# Patient Record
Sex: FEMALE | Race: WHITE | URBAN - METROPOLITAN AREA
[De-identification: names, ages, dates, MRNs, and addresses within clinical notes are randomized per-mention and may not be internally consistent; named-entity substitution may affect disease eponyms.]

---

## 2017-04-18 ENCOUNTER — HOSPITAL ENCOUNTER (OUTPATIENT)
Dept: MAMMOGRAPHY | Facility: CLINIC | Age: 32
Discharge: HOME | End: 2017-04-18
Attending: NURSE PRACTITIONER | Admitting: NURSE PRACTITIONER
Payer: COMMERCIAL

## 2017-04-18 ENCOUNTER — OFFICE VISIT (OUTPATIENT)
Dept: FAMILY MEDICINE | Facility: CLINIC | Age: 32
End: 2017-04-18
Payer: COMMERCIAL

## 2017-04-18 VITALS
HEIGHT: 67 IN | TEMPERATURE: 98 F | BODY MASS INDEX: 24.11 KG/M2 | HEART RATE: 64 BPM | SYSTOLIC BLOOD PRESSURE: 102 MMHG | WEIGHT: 153.6 LBS | RESPIRATION RATE: 18 BRPM | DIASTOLIC BLOOD PRESSURE: 64 MMHG

## 2017-04-18 DIAGNOSIS — Z80.3 FAMILY HISTORY OF MALIGNANT NEOPLASM OF BREAST: ICD-10-CM

## 2017-04-18 DIAGNOSIS — Z01.419 ENCOUNTER FOR GYNECOLOGICAL EXAMINATION WITHOUT ABNORMAL FINDING: Primary | ICD-10-CM

## 2017-04-18 DIAGNOSIS — R10.11 ABDOMINAL PAIN, RIGHT UPPER QUADRANT: ICD-10-CM

## 2017-04-18 PROBLEM — Q63.1 HORSESHOE KIDNEY: Status: ACTIVE | Noted: 2017-04-18

## 2017-04-18 PROCEDURE — 99395 PREV VISIT EST AGE 18-39: CPT | Performed by: NURSE PRACTITIONER

## 2017-04-18 PROCEDURE — 87624 HPV HI-RISK TYP POOLED RSLT: CPT | Performed by: NURSE PRACTITIONER

## 2017-04-18 PROCEDURE — G0145 SCR C/V CYTO,THINLAYER,RESCR: HCPCS | Performed by: NURSE PRACTITIONER

## 2017-04-18 PROCEDURE — G0202 SCR MAMMO BI INCL CAD: HCPCS

## 2017-04-18 ASSESSMENT — PAIN SCALES - GENERAL: PAINLEVEL: MODERATE PAIN (5)

## 2017-04-18 NOTE — PROGRESS NOTES
SUBJECTIVE:     CC: July Delaney is an 31 year old woman who presents for preventive health visit.     Healthy Habits:    Do you get at least three servings of calcium containing foods daily (dairy, green leafy vegetables, etc.)? no, taking calcium and/or vitamin D supplement: no    Amount of exercise or daily activities, outside of work: walks 3 times per week    Problems taking medications regularly No    Medication side effects: No    Have you had an eye exam in the past two years? yes    Do you see a dentist twice per year? yes    Do you have sleep apnea, excessive snoring or daytime drowsiness?no    1.) Would like to get a mammogram, has a strong history of breast cancer in her family.  2 maternal aunts with breast cancer at 29 and 33, mom with mastectomy for prevention at 30. Grandmother with uterine cancer at 50.    2.) Right side under the ribs has a spot that is bothering her, pain that comes and goes  Worse with some foods-made dietary changes but symptoms have not improved.   Ongoing for the past 6-7 months.          Today's PHQ-2 Score:   PHQ-2 ( 1999 Pfizer) 4/18/2017   Q1: Little interest or pleasure in doing things 0   Q2: Feeling down, depressed or hopeless 0   PHQ-2 Score 0       Abuse: Current or Past(Physical, Sexual or Emotional)- No  Do you feel safe in your environment - Yes    Social History   Substance Use Topics     Smoking status: Former Smoker     Smokeless tobacco: Not on file     Alcohol use Not on file     The patient does not drink >3 drinks per day nor >7 drinks per week.    No results for input(s): CHOL, HDL, LDL, TRIG, CHOLHDLRATIO, NHDL in the last 12064 hours.    Reviewed orders with patient.  Reviewed health maintenance and updated orders accordingly - Yes    Mammo Decision Support:  Alternate mammogram schedule due to breast cancer history per above    Pertinent mammograms are reviewed under the imaging tab.  History of abnormal Pap smear: NO - age 30- 65 PAP every 3 years  "recommended    Reviewed and updated as needed this visit by clinical staff  Tobacco  Allergies  Med Hx  Surg Hx  Fam Hx  Soc Hx        Reviewed and updated as needed this visit by Provider            ROS:  C: NEGATIVE for fever, chills, change in weight  I: NEGATIVE for worrisome rashes, moles or lesions  E: NEGATIVE for vision changes or irritation  ENT: NEGATIVE for ear, mouth and throat problems  R: NEGATIVE for significant cough or SOB  B: NEGATIVE for masses, tenderness or discharge  CV: NEGATIVE for chest pain, palpitations or peripheral edema  GI: POSITIVE for abdominal pain RUQ  : NEGATIVE for unusual urinary or vaginal symptoms. Periods are regular.  M: NEGATIVE for significant arthralgias or myalgia  N: NEGATIVE for weakness, dizziness or paresthesias  P: NEGATIVE for changes in mood or affect    Problem list, Medication list, Allergies, and Medical/Social/Surgical histories reviewed in EPIC and updated as appropriate.  OBJECTIVE:     /64 (BP Location: Right arm, Patient Position: Chair, Cuff Size: Adult Regular)  Pulse 64  Temp 98  F (36.7  C) (Tympanic)  Resp 18  Ht 5' 7.21\" (1.707 m)  Wt 153 lb 9.6 oz (69.7 kg)  BMI 23.91 kg/m2  EXAM:  GENERAL: healthy, alert and no distress  EYES: Eyes grossly normal to inspection, PERRL and conjunctivae and sclerae normal  HENT: ear canals and TM's normal, nose and mouth without ulcers or lesions  NECK: no adenopathy, no asymmetry, masses, or scars and thyroid normal to palpation  RESP: lungs clear to auscultation - no rales, rhonchi or wheezes  BREAST: normal without masses, tenderness or nipple discharge and no palpable axillary masses or adenopathy  CV: regular rate and rhythm, normal S1 S2, no S3 or S4, no murmur, click or rub, no peripheral edema and peripheral pulses strong  ABDOMEN: tenderness RUQ and bowel sounds normal   (female): normal female external genitalia, normal urethral meatus, vaginal mucosa pink, moist, well rugated, and " "normal cervix/adnexa/uterus without masses or discharge, IUD strings visualized  MS: no gross musculoskeletal defects noted, no edema  SKIN: no suspicious lesions or rashes  NEURO: Normal strength and tone, mentation intact and speech normal  PSYCH: mentation appears normal, affect normal/bright    ASSESSMENT/PLAN:     1. Encounter for gynecological examination without abnormal finding  - Pap imaged thin layer screen with HPV - recommended age 30 - 65  - HPV High Risk Types DNA Cervical    2. Abdominal pain, right upper quadrant  Significant RUQ discomfort will do an ultrasound to evaluate symptoms.  - US Abdomen Complete; Future    3. Family history of malignant neoplasm of breast    - *MA Screening Digital Bilateral; Future    COUNSELING:   Reviewed preventive health counseling, as reflected in patient instructions         reports that she has quit smoking. She does not have any smokeless tobacco history on file.    Estimated body mass index is 23.91 kg/(m^2) as calculated from the following:    Height as of this encounter: 5' 7.21\" (1.707 m).    Weight as of this encounter: 153 lb 9.6 oz (69.7 kg).       Counseling Resources:  ATP IV Guidelines  Pooled Cohorts Equation Calculator  Breast Cancer Risk Calculator  FRAX Risk Assessment  ICSI Preventive Guidelines  Dietary Guidelines for Americans, 2010  [employer]'s MyPlate  ASA Prophylaxis  Lung CA Screening    ASHER Epstein [facility]  "

## 2017-04-18 NOTE — PATIENT INSTRUCTIONS
Preventive Health Recommendations  Female Ages 26 - 39  Yearly exam:   See your health care provider every year in order to    Review health changes.     Discuss preventive care.      Review your medicines if you your doctor has prescribed any.    Until age 30: Get a Pap test every three years (more often if you have had an abnormal result).    After age 30: Talk to your doctor about whether you should have a Pap test every 3 years or have a Pap test with HPV screening every 5 years.   You do not need a Pap test if your uterus was removed (hysterectomy) and you have not had cancer.  You should be tested each year for STDs (sexually transmitted diseases), if you're at risk.   Talk to your provider about how often to have your cholesterol checked.  If you are at risk for diabetes, you should have a diabetes test (fasting glucose).  Shots: Get a flu shot each year. Get a tetanus shot every 10 years.   Nutrition:     Eat at least 5 servings of fruits and vegetables each day.    Eat whole-grain bread, whole-wheat pasta and brown rice instead of white grains and rice.    Talk to your provider about Calcium and Vitamin D.     Lifestyle    Exercise at least 150 minutes a week (30 minutes a day, 5 days of the week). This will help you control your weight and prevent disease.    Limit alcohol to one drink per day.    No smoking.     Wear sunscreen to prevent skin cancer.    See your dentist every six months for an exam and cleaning.    [place] Mammo Schedule   [place]- [phone]  [place]- [phone]  [place]- [phone]  [facility]- [phone]  Every other Wednesday afternoon   [place]- [phone]  Every Monday morning   Every Tuesday afternoon   Every Wednesday, Thursday, Friday   Mammogram walk-in hours in [place]: Monday-Friday, 8 a.m. - 4 p.m.  Questions? Call [phone].      Please call [phone] to schedule your ultrasound

## 2017-04-18 NOTE — NURSING NOTE
"Chief Complaint   Patient presents with     Physical       Initial /64 (BP Location: Right arm, Patient Position: Chair, Cuff Size: Adult Regular)  Pulse 64  Temp 98  F (36.7  C) (Tympanic)  Resp 18  Ht 5' 7.21\" (1.707 m)  Wt 153 lb 9.6 oz (69.7 kg)  BMI 23.91 kg/m2 Estimated body mass index is 23.91 kg/(m^2) as calculated from the following:    Height as of this encounter: 5' 7.21\" (1.707 m).    Weight as of this encounter: 153 lb 9.6 oz (69.7 kg).  Medication Reconciliation: complete    Health Maintenance that is potentially due pending provider review:  Pap Smear    Possibly completing today per provider review.    Shweta Lawrence MA  11:21 AM 4/18/2017      "

## 2017-04-18 NOTE — MR AVS SNAPSHOT
After Visit Summary   4/18/2017    July Delaney    MRN: [id number]           Patient Information     Date Of Birth          1985        Visit Information        Provider Department      4/18/2017 11:00 AM Kristine Ahumada APRN [facility]        Today's Diagnoses     Encounter for gynecological examination without abnormal finding    -  1    Family history of malignant neoplasm of breast        Abdominal pain, right upper quadrant          Care Instructions      Preventive Health Recommendations  Female Ages 26 - 39  Yearly exam:   See your health care provider every year in order to    Review health changes.     Discuss preventive care.      Review your medicines if you your doctor has prescribed any.    Until age 30: Get a Pap test every three years (more often if you have had an abnormal result).    After age 30: Talk to your doctor about whether you should have a Pap test every 3 years or have a Pap test with HPV screening every 5 years.   You do not need a Pap test if your uterus was removed (hysterectomy) and you have not had cancer.  You should be tested each year for STDs (sexually transmitted diseases), if you're at risk.   Talk to your provider about how often to have your cholesterol checked.  If you are at risk for diabetes, you should have a diabetes test (fasting glucose).  Shots: Get a flu shot each year. Get a tetanus shot every 10 years.   Nutrition:     Eat at least 5 servings of fruits and vegetables each day.    Eat whole-grain bread, whole-wheat pasta and brown rice instead of white grains and rice.    Talk to your provider about Calcium and Vitamin D.     Lifestyle    Exercise at least 150 minutes a week (30 minutes a day, 5 days of the week). This will help you control your weight and prevent disease.    Limit alcohol to one drink per day.    No smoking.     Wear sunscreen to prevent skin cancer.    See your dentist every six months for an exam  "and cleaning.    [place] Mammo Schedule   [place]- [phone]  [place]- [phone]  [place]- [phone]  [facility]- [phone]  Every other Wednesday afternoon   [place]- [phone]  Every Monday morning   Every Tuesday afternoon   Every Wednesday, Thursday, Friday   Mammogram walk-in hours in [place]: Monday-Friday, 8 a.m. - 4 p.m.  Questions? Call [phone].      Please call [phone] to schedule your ultrasound        Follow-ups after your visit        Future tests that were ordered for you today     Open Future Orders        Priority Expected Expires Ordered    US Abdomen Complete Routine 7/17/2017 4/18/2018 4/18/2017    *MA Screening Digital Bilateral Routine  4/18/2018 4/18/2017            Who to contact     If you have questions or need follow up information about today's clinic visit or your schedule please contact [facility] directly at [phone].  Normal or non-critical lab and imaging results will be communicated to you by Mercateohart, letter or phone within 4 business days after the clinic has received the results. If you do not hear from us within 7 days, please contact the clinic through Becovillaget or phone. If you have a critical or abnormal lab result, we will notify you by phone as soon as possible.  Submit refill requests through Zebit or call your pharmacy and they will forward the refill request to us. Please allow 3 business days for your refill to be completed.          Additional Information About Your Visit        Mercateohart Information     Zebit lets you send messages to your doctor, view your test results, renew your prescriptions, schedule appointments and more. To sign up, go to [email or web address]/Zebit . Click on \"Log in\" on the left side of the screen, which will take you to the Welcome page. Then click on \"Sign up Now\" on the right side of the page.     You will be asked to enter the access code listed below, as well as some personal information. " "Please follow the directions to create your username and password.     Your access code is: [email or web address]  Expires: 2017 11:38 AM     Your access code will  in 90 days. If you need help or a new code, please call your [facility] or [phone].        Care EveryWhere ID     This is your Care EveryWhere ID. This could be used by other organizations to access your [place] medical records  [id number]        Your Vitals Were     Pulse Temperature Respirations Height BMI (Body Mass Index)       64 98  F (36.7  C) (Tympanic) 18 5' 7.21\" (1.707 m) 23.91 kg/m2        Blood Pressure from Last 3 Encounters:   17 102/64   16 123/82    Weight from Last 3 Encounters:   17 153 lb 9.6 oz (69.7 kg)              We Performed the Following     HPV High Risk Types DNA Cervical     Pap imaged thin layer screen with HPV - recommended age 30 - 65        Primary Care Provider Office Phone # Fax #    Kristine ASHER Brown [phone]       [facility] [address] [address]        Thank you!     Thank you for choosing [facility]  for your care. Our goal is always to provide you with excellent care. Hearing back from our patients is one way we can continue to improve our services. Please take a few minutes to complete the written survey that you may receive in the mail after your visit with us. Thank you!             Your Updated Medication List - Protect others around you: Learn how to safely use, store and throw away your medicines at www.disposemymeds.org.          This list is accurate as of: 17 11:51 AM.  Always use your most recent med list.                   Brand Name Dispense Instructions for use    HYDROXYZINE HCL PO      Take 25 mg by mouth Reported on 2017       paragard intrauterine copper          spironolactone 100 MG tablet    ALDACTONE    90 tablet    Take 1 tablet (100 mg) by mouth daily       triamcinolone " 0.1 % cream    KENALOG    454 g    Apply to AA BID x 2-3 weeks then PRN       VENTOLIN  (90 BASE) MCG/ACT Inhaler   Generic drug:  albuterol      Inhale 2 puffs into the lungs

## 2017-04-19 ENCOUNTER — TELEPHONE (OUTPATIENT)
Dept: FAMILY MEDICINE | Facility: CLINIC | Age: 32
End: 2017-04-19

## 2017-04-19 DIAGNOSIS — Z80.3 FAMILY HISTORY OF MALIGNANT NEOPLASM OF BREAST: Primary | ICD-10-CM

## 2017-04-19 NOTE — TELEPHONE ENCOUNTER
Reason for Call:  Other     Detailed comments: Patient had a Mammo yesterday at [facility] and she was told to have a US of her breast because they were very dense.  She is having stomach US tomorrow and wanted to try and get that at the same time.  Also patient would like to sign up for [employer]elliMath[employer] - please call pt  Phone Number Patient can be reached at: Cell number on file:    Telephone Information:   Mobile [phone]       Best Time:     Can we leave a detailed message on this number? YES    Call taken on 4/19/2017 at 8:18 AM by Kennedi Ortiz

## 2017-04-19 NOTE — TELEPHONE ENCOUNTER
Dr. Burris, please advise in May villarreal's absence.  Please see below and review the patient's mammogram yesterday, please advise your recommendation. Will discuss my chart with her once US for breast approved. Thank you,   Kristen

## 2017-04-19 NOTE — TELEPHONE ENCOUNTER
I talked to the pt and she is going to call insurance again and see if they will cover a consult on breast cancer risk and let us know.

## 2017-04-19 NOTE — TELEPHONE ENCOUNTER
I tried to call pt and left message for her to call the clinic back. Given her family history I would recommend having genetic evaluation in the high risk breast cancer clinic, i believe there is someone in [place] that does this, but I put in the referral. I would not do the US yet until she has a full evaluation by genetics as she may need MRI and/or genetic testing etc.

## 2017-04-19 NOTE — TELEPHONE ENCOUNTER
July called back. She says she had called her insurance and they don't cover genetic testing unless her mother had tested positive to a specific gene. She says she doesn't want to pay for this. It was one of the technicians that did her test and told her she may need US.  I told her the radiologist doesn't make mention of doing this.  She would like to talk to Dr Burris if she is able to try her back.    Her work number is [phone]

## 2017-04-20 ENCOUNTER — HOSPITAL ENCOUNTER (OUTPATIENT)
Dept: ULTRASOUND IMAGING | Facility: CLINIC | Age: 32
Discharge: HOME | End: 2017-04-20
Attending: NURSE PRACTITIONER | Admitting: NURSE PRACTITIONER
Payer: COMMERCIAL

## 2017-04-20 DIAGNOSIS — R10.11 RIGHT UPPER QUADRANT PAIN: Primary | ICD-10-CM

## 2017-04-20 DIAGNOSIS — R10.11 ABDOMINAL PAIN, RIGHT UPPER QUADRANT: ICD-10-CM

## 2017-04-20 LAB
COPATH REPORT: NORMAL
PAP: NORMAL

## 2017-04-20 PROCEDURE — 76700 US EXAM ABDOM COMPLETE: CPT

## 2017-04-24 ENCOUNTER — MYC REFILL (OUTPATIENT)
Dept: DERMATOLOGY | Facility: CLINIC | Age: 32
End: 2017-04-24

## 2017-04-24 DIAGNOSIS — L70.0 ACNE CYSTICA: ICD-10-CM

## 2017-04-24 LAB
FINAL DIAGNOSIS: NORMAL
HPV HR 12 DNA CVX QL NAA+PROBE: NEGATIVE
HPV16 DNA SPEC QL NAA+PROBE: NEGATIVE
HPV18 DNA SPEC QL NAA+PROBE: NEGATIVE
SPECIMEN DESCRIPTION: NORMAL

## 2017-04-24 NOTE — LETTER
[facility] [place]  [address]  [facility] [address]  Phone: [phone]    April 25, 2017    July Delaney                                                                                                                       [address] [place] [address]            Dear Ms. Delaney,    We are concerned about your health care.  We recently provided you with a medication refill.  Many medications require routine follow-up with your Dermatology Provider.      At this time we ask that: You schedule a routine office visit with your Dermatology Provider to follow your Acne.    Per 11-3-2016 Dermatology office visit dictation, you were to return to Dermatology clinic in 1 month for a recheck Acne appointment.     Your prescription: Has been refilled for 1 month so you may have time for the above noted follow-up. Please be seen prior to needing your next refill of medication.     Thank you,      Matilde BARKSDALE / [facility]

## 2017-04-25 RX ORDER — SPIRONOLACTONE 100 MG/1
100 TABLET, FILM COATED ORAL DAILY
Qty: 30 TABLET | Refills: 0 | Status: SHIPPED | OUTPATIENT
Start: 2017-04-25 | End: 2017-07-13

## 2017-04-25 NOTE — TELEPHONE ENCOUNTER
Message from Squidbidt:  Original authorizing provider: LANA Hancock would like a refill of the following medications:  spironolactone (ALDACTONE) 100 MG tablet [Matilde Platt PA-C]    Preferred pharmacy: [facility] [address] [place] [address]    Comment:

## 2017-04-26 ENCOUNTER — HOSPITAL ENCOUNTER (OUTPATIENT)
Dept: NUCLEAR MEDICINE | Facility: CLINIC | Age: 32
Setting detail: NUCLEAR MEDICINE
Discharge: HOME | End: 2017-04-26
Attending: NURSE PRACTITIONER | Admitting: NURSE PRACTITIONER
Payer: COMMERCIAL

## 2017-04-26 DIAGNOSIS — R10.11 RIGHT UPPER QUADRANT PAIN: ICD-10-CM

## 2017-04-26 LAB
ALBUMIN SERPL-MCNC: 4 G/DL (ref 3.4–5)
ALP SERPL-CCNC: 53 U/L (ref 40–150)
ALT SERPL W P-5'-P-CCNC: 15 U/L (ref 0–50)
AMYLASE SERPL-CCNC: 80 U/L (ref 30–110)
ANION GAP SERPL CALCULATED.3IONS-SCNC: 6 MMOL/L (ref 3–14)
AST SERPL W P-5'-P-CCNC: 7 U/L (ref 0–45)
BILIRUB SERPL-MCNC: 0.9 MG/DL (ref 0.2–1.3)
BUN SERPL-MCNC: 8 MG/DL (ref 7–30)
CALCIUM SERPL-MCNC: 8.6 MG/DL (ref 8.5–10.1)
CHLORIDE SERPL-SCNC: 107 MMOL/L (ref 94–109)
CO2 SERPL-SCNC: 27 MMOL/L (ref 20–32)
CREAT SERPL-MCNC: 0.93 MG/DL (ref 0.52–1.04)
GFR SERPL CREATININE-BSD FRML MDRD: 70 ML/MIN/1.7M2
GLUCOSE SERPL-MCNC: 82 MG/DL (ref 70–99)
LIPASE SERPL-CCNC: 306 U/L (ref 73–393)
POTASSIUM SERPL-SCNC: 4 MMOL/L (ref 3.4–5.3)
PROT SERPL-MCNC: 7 G/DL (ref 6.8–8.8)
SODIUM SERPL-SCNC: 140 MMOL/L (ref 133–144)

## 2017-04-26 PROCEDURE — 25000128 H RX IP 250 OP 636: Performed by: NURSE PRACTITIONER

## 2017-04-26 PROCEDURE — A9537 TC99M MEBROFENIN: HCPCS | Performed by: NURSE PRACTITIONER

## 2017-04-26 PROCEDURE — 80053 COMPREHEN METABOLIC PANEL: CPT | Performed by: NURSE PRACTITIONER

## 2017-04-26 PROCEDURE — 78227 HEPATOBIL SYST IMAGE W/DRUG: CPT

## 2017-04-26 PROCEDURE — 34300033 ZZH RX 343: Performed by: NURSE PRACTITIONER

## 2017-04-26 PROCEDURE — 36415 COLL VENOUS BLD VENIPUNCTURE: CPT | Performed by: NURSE PRACTITIONER

## 2017-04-26 PROCEDURE — 83690 ASSAY OF LIPASE: CPT | Performed by: NURSE PRACTITIONER

## 2017-04-26 PROCEDURE — 82150 ASSAY OF AMYLASE: CPT | Performed by: NURSE PRACTITIONER

## 2017-04-26 RX ORDER — KIT FOR THE PREPARATION OF TECHNETIUM TC 99M MEBROFENIN 45 MG/10ML
5 INJECTION, POWDER, LYOPHILIZED, FOR SOLUTION INTRAVENOUS ONCE
Status: COMPLETED | OUTPATIENT
Start: 2017-04-26 | End: 2017-04-26

## 2017-04-26 RX ADMIN — SODIUM CHLORIDE 1.4 MCG: 9 INJECTION, SOLUTION INTRAVENOUS at 13:51

## 2017-04-26 RX ADMIN — MEBROFENIN 5 MILLICURIE: 45 INJECTION, POWDER, LYOPHILIZED, FOR SOLUTION INTRAVENOUS at 12:40

## 2017-04-28 ENCOUNTER — OFFICE VISIT (OUTPATIENT)
Dept: SURGERY | Facility: CLINIC | Age: 32
End: 2017-04-28
Payer: COMMERCIAL

## 2017-04-28 VITALS
WEIGHT: 153 LBS | DIASTOLIC BLOOD PRESSURE: 70 MMHG | SYSTOLIC BLOOD PRESSURE: 115 MMHG | BODY MASS INDEX: 24.01 KG/M2 | HEIGHT: 67 IN | TEMPERATURE: 97.9 F | HEART RATE: 66 BPM

## 2017-04-28 DIAGNOSIS — Z53.9 ERRONEOUS ENCOUNTER--DISREGARD: Primary | ICD-10-CM

## 2017-04-28 DIAGNOSIS — K82.8 BILIARY DYSKINESIA: Primary | ICD-10-CM

## 2017-04-28 PROCEDURE — 99244 OFF/OP CNSLTJ NEW/EST MOD 40: CPT | Performed by: SURGERY

## 2017-04-28 NOTE — MR AVS SNAPSHOT
After Visit Summary   4/28/2017    July Delaney    MRN: [id number]           Patient Information     Date Of Birth          1985        Visit Information        Provider Department      4/28/2017 9:00 AM Modesto Tamez MD [facility]        Today's Diagnoses     Biliary dyskinesia    -  1      Care Instructions    Per Dr. Tamez's instructions        Follow-ups after your visit        Your next 10 appointments already scheduled     May 11, 2017 11:15 AM CDT   New Visit with Kristin Sharma GC   Cancer Risk Management Program ([facility])    [facility]  [address]  [facility] [address] [phone]              Who to contact     If you have questions or need follow up information about today's clinic visit or your schedule please contact [facility] directly at [phone].  Normal or non-critical lab and imaging results will be communicated to you by MyChart, letter or phone within 4 business days after the clinic has received the results. If you do not hear from us within 7 days, please contact the clinic through MyChart or phone. If you have a critical or abnormal lab result, we will notify you by phone as soon as possible.  Submit refill requests through [facility] or call your pharmacy and they will forward the refill request to us. Please allow 3 business days for your refill to be completed.          Additional Information About Your Visit        MyChart Information     [facility] gives you secure access to your electronic health record. If you see a primary care provider, you can also send messages to your care team and make appointments. If you have questions, please call your primary care clinic.  If you do not have a primary care provider, please call [phone] and they will assist you.        Care EveryWhere ID     This is your Care EveryWhere ID. This could be used by other organizations to access your [place]  "medical records  [id number]        Your Vitals Were     Pulse Temperature Height BMI (Body Mass Index)          66 97.9  F (36.6  C) (Oral) 1.702 m (5' 7\") 23.96 kg/m2         Blood Pressure from Last 3 Encounters:   04/28/17 115/70   04/18/17 102/64   11/03/16 123/82    Weight from Last 3 Encounters:   04/28/17 69.4 kg (153 lb)   04/18/17 69.7 kg (153 lb 9.6 oz)              Today, you had the following     No orders found for display       Primary Care Provider Office Phone # Fax #    Kristine Ahumada ASHER [phone]       [address] [address]        Thank you!     Thank you for choosing [facility]  for your care. Our goal is always to provide you with excellent care. Hearing back from our patients is one way we can continue to improve our services. Please take a few minutes to complete the written survey that you may receive in the mail after your visit with us. Thank you!             Your Updated Medication List - Protect others around you: Learn how to safely use, store and throw away your medicines at www.disposemymeds.org.          This list is accurate as of: 4/28/17  9:24 AM.  Always use your most recent med list.                   Brand Name Dispense Instructions for use    HYDROXYZINE HCL PO      Take 25 mg by mouth Reported on 4/18/2017       paragard intrauterine copper          spironolactone 100 MG tablet    ALDACTONE    30 tablet    Take 1 tablet (100 mg) by mouth daily Overdue for recheck Derm appt: [phone] to schedule.       triamcinolone 0.1 % cream    KENALOG    454 g    Apply to AA BID x 2-3 weeks then PRN       VENTOLIN  (90 BASE) MCG/ACT Inhaler   Generic drug:  albuterol      Inhale 2 puffs into the lungs         "

## 2017-04-28 NOTE — PROGRESS NOTES
Asked to consult on this patient by Dr. Ahumada regarding her abdominal pain.    31-year-old female complaining of six-month history of back pain. Pain is located in the right back and is associated with occasional radiation to the front. Pain is constant and worse after meals especially greasy food. Patient has a chronic nausea associated with the pain. Pain is sharp and achy. 3 out of 10. Patient denies any history of scleral icterus. Patient recently had a HIDA scan and the pain was reproduced with CCK injection.    Patient Active Problem List   Diagnosis     Horseshoe kidney     Family history of malignant neoplasm of breast       History reviewed. No pertinent past medical history.    History reviewed. No pertinent surgical history.    Family History   Problem Relation Age of Onset     Uterine Cancer Maternal Grandmother      Breast Cancer Maternal Aunt      Breast Cancer Maternal Aunt        Social History   Substance Use Topics     Smoking status: Former Smoker     Smokeless tobacco: Not on file     Alcohol use Yes        Smoking - Counseled patient on the effects of smoking on surgical issues such as wound healing and infection rates.    History   Drug Use Not on file       Current Outpatient Prescriptions   Medication Sig Dispense Refill     spironolactone (ALDACTONE) 100 MG tablet Take 1 tablet (100 mg) by mouth daily Overdue for recheck Derm appt: [phone] to schedule. 30 tablet 0     paragard intrauterine copper        albuterol (VENTOLIN HFA) 108 (90 BASE) MCG/ACT inhaler Inhale 2 puffs into the lungs       HYDROXYZINE HCL PO Take 25 mg by mouth Reported on 4/18/2017       triamcinolone (KENALOG) 0.1 % cream Apply to AA BID x 2-3 weeks then  g 1       Allergies   Allergen Reactions     Amoxicillin Itching     Apap-Pyrilamine Hives     midol     Ceftriaxone Hives     Rocephin     Cephalexin Itching     Ciprofloxacin Itching       CBC  No results for input(s): WBC, RBC, HGB, HCT, MCV, MCH, MCHC,  RDW, PLT in the last 27796 hours.    Invalid input(s): MCT    BMP  Recent Labs   Lab Test  04/26/17   1236   NA  140   POTASSIUM  4.0   BISMARK  8.6   CHLORIDE  107   CO2  27   BUN  8   CR  0.93   GLC  82       LFTs  Recent Labs   Lab Test  04/26/17   1236   PROTTOTAL  7.0   ALBUMIN  4.0   BILITOTAL  0.9   ALKPHOS  53   AST  7   ALT  15     Results for orders placed or performed during the hospital encounter of 04/26/17   NM HepatOBiliary Scan w CCK    Narrative    NUCLEAR MEDICINE HEPATOBILIARY SCAN WITH GB EF April 26, 2017 2:43 PM     HISTORY: Right upper quadrant pain with normal ultrasound.    TECHNIQUE:  5.0 mCi of technetium 99m labeled Mebrofenin were  intravenously given while dynamically imaging the right upper abdomen.  Approximately one hour later, 1.4 mcg of cholecystokinin (CCK) was  intravenously given over 12 minutes while evaluating the gallbladder  ejection fraction.      COMPARISON:   Nuclear Study: None.    Other Relevant Studies: Abdominal ultrasound dated 4/20/2017.    FINDINGS: There is normal, homogeneous uptake of radiotracer in the  liver. The gallbladder is seen by the 5 minute image and the small  bowel is seen by the 5 minute image.    After the administration of CCK, a gallbladder ejection fraction of  19% was measured. The patient described 1/10 severity nausea and 2/10  severity abdominal pain with cholecystokinin injection. These symptoms  are different than her usual symptoms.      Impression    IMPRESSION:  1. No evidence for common bile duct or cystic duct obstruction.  2. Abnormally low gallbladder ejection fraction of 19%. Gallbladder  dyskinesis can be seen in patients with acalculous or chronic  cholecystitis.    GISELE ESCOBAR MD     ROS  Constitutional - Denies fevers, weight loss, malaise, lethargy  Neuro - Denies tremors or seizures  Pulmon - Denies SOB, dyspnea, hemoptysis, chronic cough or use of an inhaler  CV - Denies CP, SOB, lower extremity edema, difficulty w/ stairs,  "has never used NTG  GI - Denies hematemesis, BRBPR, melena, chronic diarrhea or epigastric pain   - Denies hematuria, difficulty voiding, h/o STDs  Hematology - Denies blood clotting disorders, chronic anemias  Dermatology - No melanomas or skin cancers  Rheumatology - No h/o RA  Pysch - Denies depression, bipolar d/o or schizophrenia    Exam:  Patient Vitals for the past 24 hrs:   BP Temp Temp src Pulse Height Weight   04/28/17 0900 115/70 97.9  F (36.6  C) Oral 66 1.702 m (5' 7\") 69.4 kg (153 lb)       General - Alert and Oriented X4, NAD, well nourished  HEENT - Normocephalic, atraumatic, PERRLA, Nose midline, Throat without lesions  Neck - supple, no LAD, Thyroid normal, Carotids without bruits  Lungs - Clear to auscultation bilaterally with good inspiratory effort, no tactile fremitus  CV - Heart RRR, no lift's, thrills, murmurs, rubs, or gallops. Carotid, radial, and femoral pulses 2+ bilaterally  Abdomen - Soft, non-tender, +BS, no hepatosplenomegaly, no palpable masses  Groins - 2+ pulses bilaterally and no LAD, no masses  Neuro - Full ROM, Strength 5/5 and major muscle groups, sensation intact  Extremities - No cyanosis, clubbing or edema    Assessment and plan: 31-year-old female with biliary dyskinesia. Patient is a good candidate for laparoscopic cholecystectomy. Risks benefits alternatives and complications were discussed with the patient including the possibility of infection, bleeding, or bile leak. Patient understood and wished to proceed. PATIENT IS CLEARED FOR SURGERY.    Modesto Tamez MD   "

## 2017-04-28 NOTE — LETTER
SURGERYPLANNING/SCHEDULING WORKSHEET                              [facility]  [address] [address] [phone]                          July Delaney                :  1985  MRN:  [id number]  Phone: [phone] (home)     Same Day Surgery   Surgeon: Modesto Tamez MD  Diagnosis:   Biliary dyskinesia  Allergies:  Amoxicillin; Apap-pyrilamine; Ceftriaxone; Cephalexin; and Ciprofloxacin   A preoperative evaluation and physical will be done by this office.   ====================================================  Surgical Procedure:  General Surgery:lap brad  Length of Procedure:  1 hour  Type of anesthesia:  General  The proposed surgical procedure is considered LOW risk.  Date of Procedure:________________    Time: _____________________       Special Equipment: None  Informed Consent Obtained and Signed:  NO  ====================================================  Instructions to Same Day Surgery Staff  Pneumoboots  Preop Antibiotic:  Clindamycin 600 mg IV  plus Gentamycin 1.5mg/kg IV (if penicillin allergic), pre-op within 1 hr prior to incision Infuse over 20 mins.  Preop Pain Meds:  None  Preop Orders:  Routine Standing Orders.  ====================================================  Instructions to the patient:  Preop physical exam scheduled (within 30 days or 7 days prior) with:   ____________________  Clinic:  ____________________                                         Date______________Time_________________________  Come to the hospital at: ________________________________  HOME PREPARATION:   Shower with Hibiclens the night before or the morning of surgery, gently cleaning skin from neck to feet  Bathe and brush teeth the morning of surgery.  Take medications with a sip of water the morning of surgery:   Check with  if taking insulin.  May have  a light meal, toast and clear liquids, up to 8 hrs before  surgery  May have clear liquids (liquids one can read through) up to 4 hrs before surgery  NOTHING after 4 hrs before surgery  Stop aspirin 7-10 days before surgery  Stop NSAIDS (Ibuproven, Naproxen, etc) 5 days before surgery  Stop Plavix 7-10 days before surgery      Modesto Tamez MD    4/28/2017  This form was electronically signed at chart closure                                                                        Chart Copy

## 2017-04-28 NOTE — NURSING NOTE
"Initial /70 (BP Location: Right arm, Patient Position: Chair, Cuff Size: Adult Regular)  Pulse 66  Temp 97.9  F (36.6  C) (Oral)  Ht 1.702 m (5' 7\")  Wt 69.4 kg (153 lb)  BMI 23.96 kg/m2 Estimated body mass index is 23.96 kg/(m^2) as calculated from the following:    Height as of this encounter: 1.702 m (5' 7\").    Weight as of this encounter: 69.4 kg (153 lb). .    Dawna Lira MA    "

## 2017-05-02 ENCOUNTER — ANESTHESIA EVENT (OUTPATIENT)
Dept: SURGERY | Facility: CLINIC | Age: 32
End: 2017-05-02
Payer: COMMERCIAL

## 2017-05-04 ENCOUNTER — ANESTHESIA (OUTPATIENT)
Dept: SURGERY | Facility: CLINIC | Age: 32
End: 2017-05-04
Payer: COMMERCIAL

## 2017-05-04 ENCOUNTER — HOSPITAL ENCOUNTER (OUTPATIENT)
Facility: CLINIC | Age: 32
Discharge: HOME | End: 2017-05-04
Attending: SURGERY | Admitting: SURGERY
Payer: COMMERCIAL

## 2017-05-04 VITALS
DIASTOLIC BLOOD PRESSURE: 65 MMHG | RESPIRATION RATE: 16 BRPM | OXYGEN SATURATION: 96 % | HEIGHT: 67 IN | WEIGHT: 153 LBS | TEMPERATURE: 97.9 F | BODY MASS INDEX: 24.01 KG/M2 | SYSTOLIC BLOOD PRESSURE: 120 MMHG

## 2017-05-04 DIAGNOSIS — G89.18 POST-OP PAIN: Primary | ICD-10-CM

## 2017-05-04 LAB — HCG UR QL: NEGATIVE

## 2017-05-04 PROCEDURE — 25000125 ZZHC RX 250: Performed by: SURGERY

## 2017-05-04 PROCEDURE — 36000056 ZZH SURGERY LEVEL 3 1ST 30 MIN: Performed by: SURGERY

## 2017-05-04 PROCEDURE — 25800025 ZZH RX 258: Performed by: SURGERY

## 2017-05-04 PROCEDURE — 37000008 ZZH ANESTHESIA TECHNICAL FEE, 1ST 30 MIN: Performed by: SURGERY

## 2017-05-04 PROCEDURE — 71000013 ZZH RECOVERY PHASE 1 LEVEL 1 EA ADDTL HR: Performed by: SURGERY

## 2017-05-04 PROCEDURE — 25000128 H RX IP 250 OP 636: Performed by: NURSE ANESTHETIST, CERTIFIED REGISTERED

## 2017-05-04 PROCEDURE — 25800025 ZZH RX 258: Performed by: NURSE ANESTHETIST, CERTIFIED REGISTERED

## 2017-05-04 PROCEDURE — S0077 INJECTION, CLINDAMYCIN PHOSP: HCPCS | Performed by: SURGERY

## 2017-05-04 PROCEDURE — 25000125 ZZHC RX 250: Performed by: NURSE ANESTHETIST, CERTIFIED REGISTERED

## 2017-05-04 PROCEDURE — 27210794 ZZH OR GENERAL SUPPLY STERILE: Performed by: SURGERY

## 2017-05-04 PROCEDURE — 71000027 ZZH RECOVERY PHASE 2 EACH 15 MINS: Performed by: SURGERY

## 2017-05-04 PROCEDURE — 47562 LAPAROSCOPIC CHOLECYSTECTOMY: CPT | Mod: AS | Performed by: PHYSICIAN ASSISTANT

## 2017-05-04 PROCEDURE — 25000132 ZZH RX MED GY IP 250 OP 250 PS 637: Performed by: NURSE ANESTHETIST, CERTIFIED REGISTERED

## 2017-05-04 PROCEDURE — 25000132 ZZH RX MED GY IP 250 OP 250 PS 637: Performed by: SURGERY

## 2017-05-04 PROCEDURE — 81025 URINE PREGNANCY TEST: CPT | Performed by: NURSE ANESTHETIST, CERTIFIED REGISTERED

## 2017-05-04 PROCEDURE — 47562 LAPAROSCOPIC CHOLECYSTECTOMY: CPT | Performed by: SURGERY

## 2017-05-04 PROCEDURE — 40000306 ZZH STATISTIC PRE PROC ASSESS II: Performed by: SURGERY

## 2017-05-04 PROCEDURE — 36000058 ZZH SURGERY LEVEL 3 EA 15 ADDTL MIN: Performed by: SURGERY

## 2017-05-04 PROCEDURE — 37000009 ZZH ANESTHESIA TECHNICAL FEE, EACH ADDTL 15 MIN: Performed by: SURGERY

## 2017-05-04 PROCEDURE — 25000564 ZZH DESFLURANE, EA 15 MIN: Performed by: SURGERY

## 2017-05-04 PROCEDURE — 71000012 ZZH RECOVERY PHASE 1 LEVEL 1 FIRST HR: Performed by: SURGERY

## 2017-05-04 PROCEDURE — 27110028 ZZH OR GENERAL SUPPLY NON-STERILE: Performed by: SURGERY

## 2017-05-04 PROCEDURE — 88304 TISSUE EXAM BY PATHOLOGIST: CPT | Mod: 26 | Performed by: SURGERY

## 2017-05-04 PROCEDURE — 88304 TISSUE EXAM BY PATHOLOGIST: CPT | Performed by: SURGERY

## 2017-05-04 RX ORDER — ONDANSETRON 4 MG/1
4 TABLET, ORALLY DISINTEGRATING ORAL EVERY 30 MIN PRN
Status: DISCONTINUED | OUTPATIENT
Start: 2017-05-04 | End: 2017-05-04 | Stop reason: HOSPADM

## 2017-05-04 RX ORDER — CLINDAMYCIN PHOSPHATE 900 MG/50ML
900 INJECTION, SOLUTION INTRAVENOUS
Status: COMPLETED | OUTPATIENT
Start: 2017-05-04 | End: 2017-05-04

## 2017-05-04 RX ORDER — FENTANYL CITRATE 50 UG/ML
INJECTION, SOLUTION INTRAMUSCULAR; INTRAVENOUS PRN
Status: DISCONTINUED | OUTPATIENT
Start: 2017-05-04 | End: 2017-05-04

## 2017-05-04 RX ORDER — NALOXONE HYDROCHLORIDE 0.4 MG/ML
.1-.4 INJECTION, SOLUTION INTRAMUSCULAR; INTRAVENOUS; SUBCUTANEOUS
Status: DISCONTINUED | OUTPATIENT
Start: 2017-05-04 | End: 2017-05-04 | Stop reason: HOSPADM

## 2017-05-04 RX ORDER — HYDROCODONE BITARTRATE AND ACETAMINOPHEN 5; 325 MG/1; MG/1
1-2 TABLET ORAL EVERY 6 HOURS PRN
Qty: 45 TABLET | Refills: 0 | Status: SHIPPED | OUTPATIENT
Start: 2017-05-04 | End: 2017-08-09

## 2017-05-04 RX ORDER — PROPOFOL 10 MG/ML
INJECTION, EMULSION INTRAVENOUS PRN
Status: DISCONTINUED | OUTPATIENT
Start: 2017-05-04 | End: 2017-05-04

## 2017-05-04 RX ORDER — ONDANSETRON 2 MG/ML
4 INJECTION INTRAMUSCULAR; INTRAVENOUS EVERY 30 MIN PRN
Status: DISCONTINUED | OUTPATIENT
Start: 2017-05-04 | End: 2017-05-04 | Stop reason: HOSPADM

## 2017-05-04 RX ORDER — HYDROXYZINE HYDROCHLORIDE 25 MG/1
25 TABLET, FILM COATED ORAL ONCE
Status: DISCONTINUED | OUTPATIENT
Start: 2017-05-04 | End: 2017-05-04 | Stop reason: HOSPADM

## 2017-05-04 RX ORDER — MEPERIDINE HYDROCHLORIDE 50 MG/ML
INJECTION INTRAMUSCULAR; INTRAVENOUS; SUBCUTANEOUS PRN
Status: DISCONTINUED | OUTPATIENT
Start: 2017-05-04 | End: 2017-05-04

## 2017-05-04 RX ORDER — LIDOCAINE 40 MG/G
CREAM TOPICAL
Status: DISCONTINUED | OUTPATIENT
Start: 2017-05-04 | End: 2017-05-04 | Stop reason: HOSPADM

## 2017-05-04 RX ORDER — KETOROLAC TROMETHAMINE 30 MG/ML
INJECTION, SOLUTION INTRAMUSCULAR; INTRAVENOUS PRN
Status: DISCONTINUED | OUTPATIENT
Start: 2017-05-04 | End: 2017-05-04

## 2017-05-04 RX ORDER — SCOLOPAMINE TRANSDERMAL SYSTEM 1 MG/1
1 PATCH, EXTENDED RELEASE TRANSDERMAL ONCE
Status: COMPLETED | OUTPATIENT
Start: 2017-05-04 | End: 2017-05-04

## 2017-05-04 RX ORDER — DEXAMETHASONE SODIUM PHOSPHATE 4 MG/ML
INJECTION, SOLUTION INTRA-ARTICULAR; INTRALESIONAL; INTRAMUSCULAR; INTRAVENOUS; SOFT TISSUE PRN
Status: DISCONTINUED | OUTPATIENT
Start: 2017-05-04 | End: 2017-05-04

## 2017-05-04 RX ORDER — HYDROMORPHONE HYDROCHLORIDE 1 MG/ML
.3-.5 INJECTION, SOLUTION INTRAMUSCULAR; INTRAVENOUS; SUBCUTANEOUS EVERY 10 MIN PRN
Status: DISCONTINUED | OUTPATIENT
Start: 2017-05-04 | End: 2017-05-04 | Stop reason: HOSPADM

## 2017-05-04 RX ORDER — LIDOCAINE HYDROCHLORIDE 10 MG/ML
INJECTION, SOLUTION EPIDURAL; INFILTRATION; INTRACAUDAL; PERINEURAL PRN
Status: DISCONTINUED | OUTPATIENT
Start: 2017-05-04 | End: 2017-05-04

## 2017-05-04 RX ORDER — ONDANSETRON 2 MG/ML
INJECTION INTRAMUSCULAR; INTRAVENOUS PRN
Status: DISCONTINUED | OUTPATIENT
Start: 2017-05-04 | End: 2017-05-04

## 2017-05-04 RX ORDER — HYDROCODONE BITARTRATE AND ACETAMINOPHEN 5; 325 MG/1; MG/1
1-2 TABLET ORAL EVERY 6 HOURS PRN
Status: DISCONTINUED | OUTPATIENT
Start: 2017-05-04 | End: 2017-05-04 | Stop reason: HOSPADM

## 2017-05-04 RX ORDER — SODIUM CHLORIDE, SODIUM LACTATE, POTASSIUM CHLORIDE, CALCIUM CHLORIDE 600; 310; 30; 20 MG/100ML; MG/100ML; MG/100ML; MG/100ML
INJECTION, SOLUTION INTRAVENOUS CONTINUOUS
Status: DISCONTINUED | OUTPATIENT
Start: 2017-05-04 | End: 2017-05-04 | Stop reason: HOSPADM

## 2017-05-04 RX ORDER — GLYCOPYRROLATE 0.2 MG/ML
INJECTION, SOLUTION INTRAMUSCULAR; INTRAVENOUS PRN
Status: DISCONTINUED | OUTPATIENT
Start: 2017-05-04 | End: 2017-05-04

## 2017-05-04 RX ORDER — NEOSTIGMINE METHYLSULFATE 1 MG/ML
VIAL (ML) INJECTION PRN
Status: DISCONTINUED | OUTPATIENT
Start: 2017-05-04 | End: 2017-05-04

## 2017-05-04 RX ORDER — FENTANYL CITRATE 50 UG/ML
25-50 INJECTION, SOLUTION INTRAMUSCULAR; INTRAVENOUS
Status: DISCONTINUED | OUTPATIENT
Start: 2017-05-04 | End: 2017-05-04 | Stop reason: HOSPADM

## 2017-05-04 RX ORDER — CLINDAMYCIN PHOSPHATE 900 MG/50ML
900 INJECTION, SOLUTION INTRAVENOUS SEE ADMIN INSTRUCTIONS
Status: DISCONTINUED | OUTPATIENT
Start: 2017-05-04 | End: 2017-05-04 | Stop reason: HOSPADM

## 2017-05-04 RX ORDER — CEFAZOLIN SODIUM 1 G/3ML
1 INJECTION, POWDER, FOR SOLUTION INTRAMUSCULAR; INTRAVENOUS SEE ADMIN INSTRUCTIONS
Status: DISCONTINUED | OUTPATIENT
Start: 2017-05-04 | End: 2017-05-04 | Stop reason: ALTCHOICE

## 2017-05-04 RX ORDER — CEFAZOLIN SODIUM 2 G/100ML
2 INJECTION, SOLUTION INTRAVENOUS
Status: DISCONTINUED | OUTPATIENT
Start: 2017-05-04 | End: 2017-05-04 | Stop reason: ALTCHOICE

## 2017-05-04 RX ORDER — BUPIVACAINE HYDROCHLORIDE AND EPINEPHRINE 2.5; 5 MG/ML; UG/ML
INJECTION, SOLUTION INFILTRATION; PERINEURAL PRN
Status: DISCONTINUED | OUTPATIENT
Start: 2017-05-04 | End: 2017-05-04 | Stop reason: HOSPADM

## 2017-05-04 RX ORDER — MEPERIDINE HYDROCHLORIDE 25 MG/ML
12.5 INJECTION INTRAMUSCULAR; INTRAVENOUS; SUBCUTANEOUS
Status: DISCONTINUED | OUTPATIENT
Start: 2017-05-04 | End: 2017-05-04 | Stop reason: HOSPADM

## 2017-05-04 RX ADMIN — FENTANYL CITRATE 150 MCG: 50 INJECTION, SOLUTION INTRAMUSCULAR; INTRAVENOUS at 11:12

## 2017-05-04 RX ADMIN — FENTANYL CITRATE 100 MCG: 50 INJECTION, SOLUTION INTRAMUSCULAR; INTRAVENOUS at 11:10

## 2017-05-04 RX ADMIN — KETOROLAC TROMETHAMINE 30 MG: 30 INJECTION, SOLUTION INTRAMUSCULAR at 11:37

## 2017-05-04 RX ADMIN — MEPERIDINE HYDROCHLORIDE 50 MG: 50 INJECTION, SOLUTION INTRAMUSCULAR; INTRAVENOUS; SUBCUTANEOUS at 11:46

## 2017-05-04 RX ADMIN — ROCURONIUM BROMIDE 20 MG: 10 INJECTION INTRAVENOUS at 11:12

## 2017-05-04 RX ADMIN — CLINDAMYCIN PHOSPHATE 900 MG: 18 INJECTION, SOLUTION INTRAVENOUS at 11:08

## 2017-05-04 RX ADMIN — FENTANYL CITRATE 50 MCG: 50 INJECTION, SOLUTION INTRAMUSCULAR; INTRAVENOUS at 12:47

## 2017-05-04 RX ADMIN — SODIUM CHLORIDE, POTASSIUM CHLORIDE, SODIUM LACTATE AND CALCIUM CHLORIDE: 600; 310; 30; 20 INJECTION, SOLUTION INTRAVENOUS at 12:23

## 2017-05-04 RX ADMIN — LIDOCAINE HYDROCHLORIDE 50 MG: 10 INJECTION, SOLUTION EPIDURAL; INFILTRATION; INTRACAUDAL; PERINEURAL at 11:12

## 2017-05-04 RX ADMIN — MIDAZOLAM HYDROCHLORIDE 2 MG: 1 INJECTION, SOLUTION INTRAMUSCULAR; INTRAVENOUS at 11:08

## 2017-05-04 RX ADMIN — HYDROCODONE BITARTRATE AND ACETAMINOPHEN 1 TABLET: 5; 325 TABLET ORAL at 14:08

## 2017-05-04 RX ADMIN — ONDANSETRON 4 MG: 2 INJECTION INTRAMUSCULAR; INTRAVENOUS at 11:12

## 2017-05-04 RX ADMIN — SCOPALAMINE 1 PATCH: 1 PATCH, EXTENDED RELEASE TRANSDERMAL at 12:32

## 2017-05-04 RX ADMIN — GLYCOPYRROLATE 0.2 MG: 0.2 INJECTION, SOLUTION INTRAMUSCULAR; INTRAVENOUS at 11:12

## 2017-05-04 RX ADMIN — ONDANSETRON 4 MG: 2 INJECTION INTRAMUSCULAR; INTRAVENOUS at 12:21

## 2017-05-04 RX ADMIN — HYDROXYZINE HYDROCHLORIDE 25 MG: 25 TABLET, FILM COATED ORAL at 14:09

## 2017-05-04 RX ADMIN — PROPOFOL 150 MG: 10 INJECTION, EMULSION INTRAVENOUS at 11:12

## 2017-05-04 RX ADMIN — HYDROMORPHONE HYDROCHLORIDE 0.5 MG: 1 INJECTION, SOLUTION INTRAMUSCULAR; INTRAVENOUS; SUBCUTANEOUS at 12:36

## 2017-05-04 RX ADMIN — FENTANYL CITRATE 50 MCG: 50 INJECTION, SOLUTION INTRAMUSCULAR; INTRAVENOUS at 13:06

## 2017-05-04 RX ADMIN — DEXAMETHASONE SODIUM PHOSPHATE 4 MG: 4 INJECTION, SOLUTION INTRA-ARTICULAR; INTRALESIONAL; INTRAMUSCULAR; INTRAVENOUS; SOFT TISSUE at 11:12

## 2017-05-04 RX ADMIN — ONDANSETRON 4 MG: 2 INJECTION INTRAMUSCULAR; INTRAVENOUS at 12:09

## 2017-05-04 RX ADMIN — GLYCOPYRROLATE 0.6 MG: 0.2 INJECTION, SOLUTION INTRAMUSCULAR; INTRAVENOUS at 11:42

## 2017-05-04 RX ADMIN — SODIUM CHLORIDE, POTASSIUM CHLORIDE, SODIUM LACTATE AND CALCIUM CHLORIDE: 600; 310; 30; 20 INJECTION, SOLUTION INTRAVENOUS at 10:42

## 2017-05-04 RX ADMIN — LIDOCAINE HYDROCHLORIDE 1 ML: 10 INJECTION, SOLUTION INFILTRATION; PERINEURAL at 10:42

## 2017-05-04 RX ADMIN — ROCURONIUM BROMIDE 10 MG: 10 INJECTION INTRAVENOUS at 11:11

## 2017-05-04 RX ADMIN — Medication 3 MG: at 11:42

## 2017-05-04 ASSESSMENT — LIFESTYLE VARIABLES: TOBACCO_USE: 1

## 2017-05-04 NOTE — H&P (VIEW-ONLY)
Asked to consult on this patient by Dr. Ahumada regarding her abdominal pain.    31-year-old female complaining of six-month history of back pain. Pain is located in the right back and is associated with occasional radiation to the front. Pain is constant and worse after meals especially greasy food. Patient has a chronic nausea associated with the pain. Pain is sharp and achy. 3 out of 10. Patient denies any history of scleral icterus. Patient recently had a HIDA scan and the pain was reproduced with CCK injection.    Patient Active Problem List   Diagnosis     Horseshoe kidney     Family history of malignant neoplasm of breast       History reviewed. No pertinent past medical history.    History reviewed. No pertinent surgical history.    Family History   Problem Relation Age of Onset     Uterine Cancer Maternal Grandmother      Breast Cancer Maternal Aunt      Breast Cancer Maternal Aunt        Social History   Substance Use Topics     Smoking status: Former Smoker     Smokeless tobacco: Not on file     Alcohol use Yes        Smoking - Counseled patient on the effects of smoking on surgical issues such as wound healing and infection rates.    History   Drug Use Not on file       Current Outpatient Prescriptions   Medication Sig Dispense Refill     spironolactone (ALDACTONE) 100 MG tablet Take 1 tablet (100 mg) by mouth daily Overdue for recheck Derm appt: [phone] to schedule. 30 tablet 0     paragard intrauterine copper        albuterol (VENTOLIN HFA) 108 (90 BASE) MCG/ACT inhaler Inhale 2 puffs into the lungs       HYDROXYZINE HCL PO Take 25 mg by mouth Reported on 4/18/2017       triamcinolone (KENALOG) 0.1 % cream Apply to AA BID x 2-3 weeks then  g 1       Allergies   Allergen Reactions     Amoxicillin Itching     Apap-Pyrilamine Hives     midol     Ceftriaxone Hives     Rocephin     Cephalexin Itching     Ciprofloxacin Itching       CBC  No results for input(s): WBC, RBC, HGB, HCT, MCV, MCH, MCHC,  RDW, PLT in the last 79793 hours.    Invalid input(s): MCT    BMP  Recent Labs   Lab Test  04/26/17   1236   NA  140   POTASSIUM  4.0   BISMARK  8.6   CHLORIDE  107   CO2  27   BUN  8   CR  0.93   GLC  82       LFTs  Recent Labs   Lab Test  04/26/17   1236   PROTTOTAL  7.0   ALBUMIN  4.0   BILITOTAL  0.9   ALKPHOS  53   AST  7   ALT  15     Results for orders placed or performed during the hospital encounter of 04/26/17   NM HepatOBiliary Scan w CCK    Narrative    NUCLEAR MEDICINE HEPATOBILIARY SCAN WITH GB EF April 26, 2017 2:43 PM     HISTORY: Right upper quadrant pain with normal ultrasound.    TECHNIQUE:  5.0 mCi of technetium 99m labeled Mebrofenin were  intravenously given while dynamically imaging the right upper abdomen.  Approximately one hour later, 1.4 mcg of cholecystokinin (CCK) was  intravenously given over 12 minutes while evaluating the gallbladder  ejection fraction.      COMPARISON:   Nuclear Study: None.    Other Relevant Studies: Abdominal ultrasound dated 4/20/2017.    FINDINGS: There is normal, homogeneous uptake of radiotracer in the  liver. The gallbladder is seen by the 5 minute image and the small  bowel is seen by the 5 minute image.    After the administration of CCK, a gallbladder ejection fraction of  19% was measured. The patient described 1/10 severity nausea and 2/10  severity abdominal pain with cholecystokinin injection. These symptoms  are different than her usual symptoms.      Impression    IMPRESSION:  1. No evidence for common bile duct or cystic duct obstruction.  2. Abnormally low gallbladder ejection fraction of 19%. Gallbladder  dyskinesis can be seen in patients with acalculous or chronic  cholecystitis.    GISELE ESCOBAR MD     ROS  Constitutional - Denies fevers, weight loss, malaise, lethargy  Neuro - Denies tremors or seizures  Pulmon - Denies SOB, dyspnea, hemoptysis, chronic cough or use of an inhaler  CV - Denies CP, SOB, lower extremity edema, difficulty w/ stairs,  "has never used NTG  GI - Denies hematemesis, BRBPR, melena, chronic diarrhea or epigastric pain   - Denies hematuria, difficulty voiding, h/o STDs  Hematology - Denies blood clotting disorders, chronic anemias  Dermatology - No melanomas or skin cancers  Rheumatology - No h/o RA  Pysch - Denies depression, bipolar d/o or schizophrenia    Exam:  Patient Vitals for the past 24 hrs:   BP Temp Temp src Pulse Height Weight   04/28/17 0900 115/70 97.9  F (36.6  C) Oral 66 1.702 m (5' 7\") 69.4 kg (153 lb)       General - Alert and Oriented X4, NAD, well nourished  HEENT - Normocephalic, atraumatic, PERRLA, Nose midline, Throat without lesions  Neck - supple, no LAD, Thyroid normal, Carotids without bruits  Lungs - Clear to auscultation bilaterally with good inspiratory effort, no tactile fremitus  CV - Heart RRR, no lift's, thrills, murmurs, rubs, or gallops. Carotid, radial, and femoral pulses 2+ bilaterally  Abdomen - Soft, non-tender, +BS, no hepatosplenomegaly, no palpable masses  Groins - 2+ pulses bilaterally and no LAD, no masses  Neuro - Full ROM, Strength 5/5 and major muscle groups, sensation intact  Extremities - No cyanosis, clubbing or edema    Assessment and plan: 31-year-old female with biliary dyskinesia. Patient is a good candidate for laparoscopic cholecystectomy. Risks benefits alternatives and complications were discussed with the patient including the possibility of infection, bleeding, or bile leak. Patient understood and wished to proceed. PATIENT IS CLEARED FOR SURGERY.    Modesto Tamez MD   "

## 2017-05-04 NOTE — BRIEF OP NOTE
PreOp Dx: Biliary dyskinesia    PostOp Dx: Same    Procedure: lap brad    Surgeon: LINN Tamez    Anesthesia: GET Winn CRNA    Findings: Gb without stones    IVF: 800ml    UOP: n/a    EBL: 5ml      Modesto Tamez MD

## 2017-05-04 NOTE — ANESTHESIA PREPROCEDURE EVALUATION
Anesthesia Evaluation     . Pt has not had prior anesthetic            ROS/MED HX    ENT/Pulmonary:     (+)tobacco use, Past use , . .    Neurologic:  - neg neurologic ROS     Cardiovascular:  - neg cardiovascular ROS       METS/Exercise Tolerance:  >4 METS   Hematologic:  - neg hematologic  ROS       Musculoskeletal:  - neg musculoskeletal ROS       GI/Hepatic:  - neg GI/hepatic ROS       Renal/Genitourinary: Comment: Horseshoe kidney        Endo:  - neg endo ROS       Psychiatric:  - neg psychiatric ROS       Infectious Disease:  - neg infectious disease ROS       Malignancy:      - no malignancy   Other:    (+) No chance of pregnancy                    Physical Exam  Normal systems: cardiovascular, pulmonary and dental    Airway   Mallampati: I  Neck ROM: full    Dental     Cardiovascular       Pulmonary                     Anesthesia Plan      History & Physical Review      ASA Status:  1 .    NPO Status:  > 8 hours    Plan for General and ETT with Intravenous and Propofol induction. Maintenance will be Balanced.    PONV prophylaxis:  Ondansetron (or other 5HT-3) and Dexamethasone or Solumedrol  Additional equipment: Videolaryngoscope      Postoperative Care  Postoperative pain management:  IV analgesics and Oral pain medications.      Consents  Anesthetic plan, risks, benefits and alternatives discussed with:  Patient..                          .

## 2017-05-04 NOTE — IP AVS SNAPSHOT
[facility] PreOP/Phase II    [address] [address]    Phone:  [phone]    Fax:  [phone]                                       After Visit Summary   5/4/2017    July Delaney    MRN: [id number]           After Visit Summary Signature Page     I have received my discharge instructions, and my questions have been answered. I have discussed any challenges I see with this plan with the nurse or doctor.    ..........................................................................................................................................  Patient/Patient Representative Signature      ..........................................................................................................................................  Patient Representative Print Name and Relationship to Patient    ..................................................               ................................................  Date                                            Time    ..........................................................................................................................................  Reviewed by Signature/Title    ...................................................              ..............................................  Date                                                            Time

## 2017-05-04 NOTE — OP NOTE
DATE OF SURGERY:  05/04/2017.      PREOPERATIVE DIAGNOSIS:  Biliary dyskinesia.      POSTOPERATIVE DIAGNOSIS:  Biliary dyskinesia.      PROCEDURE:  Laparoscopic cholecystectomy.      SURGEON:  Modesto Tamez MD      FIRST ASSISTANT:  MISSY Perez (needed for expertise in camera operation, retraction, hemostasis and wound closure).      ANESTHESIA:  General.      ANESTHESIOLOGIST:  Emiliano Winn CRNA      FINDINGS:  A relatively noninflamed gallbladder, successfully removed laparoscopically.      INDICATIONS:  July Delaney is a 31-year-old female seen in my clinic complaining of right upper quadrant abdominal pain.  She had a HIDA scan done showing ejection fraction of 19%.      CONSENT:  Risks, benefits, alternatives and complications were discussed with the patient, including the possibility of infection, bleeding or bile leak.  Patient understood and wished to proceed.      DESCRIPTION OF PROCEDURE:  Patient was taken to the operating room and placed in supine position.  General endotracheal anesthesia was induced and surgical timeout was performed.  Clindamycin 900 mg was used as perioperative antibiotics and her abdomen was cleaned and draped in a sterile manner.  Marcaine 0.25% with epinephrine was used to anesthetize all port sites.  A small subumbilical curvilinear incision was made and subcutaneous tissues dissected to the fascia.  The fascia was opened sharply and a 12 mm Juan Carlos trocar inserted.  Carbon dioxide was insufflated to a pressure of 15 mmHg.  Under direct vision, separate subxiphoid 11 mm trocar was placed, as were 2 right-sided 5 mm trocars.  Attention was turned to the right upper quadrant.  The gallbladder was easily located.  It was grasped and elevated.  It was not inflamed.  A small amount of omentum around the neck of the gallbladder was dissected free and the fat also surrounding the neck was dissected until the cystic duct was located.  The duct was encircled, clipped twice  proximally, once distally and ligated.  Two separate branches of the cystic artery were both located and ligated.  The gallbladder was then removed from the liver bed using electrocautery, placed in an EndoCatch bag and brought out through the subxiphoid port.  Three liters of warm normal saline solution was used to irrigate out the abdominal cavity and this was sucked free until the effluent was clear.  A final inspection of the liver bed revealed no evidence of hemorrhage or leakage and both cystic artery and duct stumps were intact with clips applied.  All trocars were then removed under direct vision and the air allowed to desufflate.  The fascial defect of the subumbilical port was closed using an 0 Vicryl suture in a figure-of-eight fashion and this was bolstered with a second 0 Vicryl on top of that and reinjected with Marcaine.  All wounds were irrigated with normal saline and the skin closed using 4-0 Vicryl in a subcuticular fashion and dressed with Dermabond.  Patient tolerated the procedure well, was extubated on the table and transferred to the PACU in stable condition.      PLAN:  Following a stable postoperative course, she will be discharged home with a regular diet.      ACTIVITY:  No heavy lifting for 1 month.      DISABILITY:  None.      MEDICATIONS:  Prescription written for Vicodin.      INSTRUCTIONS:  Patient advised to wash her wounds daily with soap and water and to follow up with me in 1-2 weeks.      ESTIMATED BLOOD LOSS:  5 mL.      INTRAVENOUS FLUIDS:  800.         FILIPE MOSS             D: 2017 11:49   T: 2017 16:59   MT: TS      Name:     TOSHA TEJEDA   MRN:      [id number]        Account:        [id number]   :      1985           Procedure Date: 2017      Document: [id number]       cc: Filipe STERLING, CNP

## 2017-05-04 NOTE — IP AVS SNAPSHOT
MRN:[id number]                      After Visit Summary   5/4/2017    July Delaney    MRN: [id number]           Thank you!     Thank you for choosing [facility] for your care. Our goal is always to provide you with excellent care. Hearing back from our patients is one way we can continue to improve our services. Please take a few minutes to complete the written survey that you may receive in the mail after you visit with us. Thank you!        Patient Information     Date Of Birth          1985        About your hospital stay     You were admitted on:  May 4, 2017 You last received care in the:  [facility] PreOP/Phase II    You were discharged on:  May 4, 2017       Who to Call     For medical emergencies, please call 911.  For non-urgent questions about your medical care, please call your primary care provider or clinic, [phone]  For questions related to your surgery, please call your surgery clinic        Attending Provider     Provider Specialty    Modesto Tamez MD Surgery       Primary Care Provider Office Phone # Fax #    Kristine ASHER Brown [phone]       [address] [address]        Your next 10 appointments already scheduled     May 11, 2017 11:15 AM CDT   New Visit with Kristin Sharma GC   Cancer Risk Management Program ([facility])    [facility]  [address]  [facility] [address]   [phone]              Further instructions from your care team                         Same Day Surgery Discharge Instructions  Special Precautions After Surgery - Adult    1. It is not unusual to feel lightheaded or faint, up to 24 hours after surgery or while taking pain medication.  If you have these symptoms; sit for a few minutes before standing and have someone assist you when getting up.  2. You should rest and relax for the next 24 hours and must have someone stay with  you for at least 24 hours after your discharge.  3. DO NOT DRIVE any vehicle or operate mechanical equipment for 24 hours following the end of your surgery.  DO NOT DRIVE while taking narcotic pain medications that have been prescribed by your physician.  If you had a limb operated on, you must be able to use it fully to drive.  4. DO NOT drink alcoholic beverages for 24 hours following surgery or while taking prescription pain medication.  5. Drink clear liquids (apple juice, ginger ale, broth, 7-Up, etc.).  Progress to your regular diet as you feel able.  6. Any questions call your physician and do not make important decisions for 24 hours.      Discharge instructions for Patient with Scopolamine Transdermal Patch    1.  You may leave the patch on behind your ear for three days-But NO LONGER.  May have withdrawal symptoms (nausea, vomiting, headache,dizziness) if used longer.  2.  When you remove the patch, you must wash and dry your hands thoroughly and before touching your eyes, as pupils may dilate.  3.  Discard patch (away from children and pets).  4.  May develop some urinary hesitancy or urine retention.  5.  Patch should be removed by:___tomorrow pm.___       Surgery Specialty Clinic:  [phone]     Same Day Surgery [phone], Monday thru Friday 6am-9pm.        Wash incisions daily with soap and water. Some mild redness or swelling is expected. If draining, cover with dry gauze.    No heavy lifting (less than 30 pounds) for 1 month.    Okay to use ice pack over wound as necessary for comfort.    Use pain medication as necessary but avoid constipating side effects. Ibuprofen okay but avoid Tylenol as your pain medication already contains this.    Diet as tolerated. No restrictions.    Follow up with Dr Tamez in 1-2 weeks.    Most people take the rest of the week off and return to work the following Monday. You may return sooner as pain allows. During your follow-up appointment, Dr. Tamez will give you a  "formal letter for your work with any restrictions detailed.  All disability or other such paperwork will be addressed at that time.                  Pending Results     No orders found from 5/2/2017 to 5/5/2017.            Admission Information     Date & Time Provider Department Dept. Phone    5/4/2017 Modesto Tamez MD [facility] PreOP/Phase [phone]      Your Vitals Were     Blood Pressure Temperature Respirations Height Weight Last Period    119/81 97.9  F (36.6  C) (Oral) 16 1.702 m (5' 7\") 69.4 kg (153 lb) 04/14/2017    Pulse Oximetry BMI (Body Mass Index)                100% 23.96 kg/m2          MyChart Information     [email or web address] gives you secure access to your electronic health record. If you see a primary care provider, you can also send messages to your care team and make appointments. If you have questions, please call your primary care clinic.  If you do not have a primary care provider, please call [phone] and they will assist you.        Care EveryWhere ID     This is your Care EveryWhere ID. This could be used by other organizations to access your [facility] medical records  [id number]           Review of your medicines      START taking        Dose / Directions    HYDROcodone-acetaminophen 5-325 MG per tablet   Commonly known as:  NORCO   Used for:  Post-op pain   Notes to Patient:  You had 1 pain pill at 2:15 pm. You had 1 vistaril at 2:15 pm.        Dose:  1-2 tablet   Take 1-2 tablets by mouth every 6 hours as needed   Quantity:  45 tablet   Refills:  0         CONTINUE these medicines which have NOT CHANGED        Dose / Directions    ALEVE PO   Indication:  Mild to Moderate Pain        Dose:  220 mg   Take 220 mg by mouth daily   Refills:  0       IBUPROFEN PO   Indication:  Mild to Moderate Pain        Dose:  400 mg   Take 400 mg by mouth every 6 hours   Refills:  0       paragard intrauterine copper        Refills:  0       spironolactone 100 MG tablet   Commonly known as:  " ALDACTONE   Used for:  Acne cystica        Dose:  100 mg   Take 1 tablet (100 mg) by mouth daily Overdue for recheck Derm appt: [phone] to schedule.   Quantity:  30 tablet   Refills:  0       triamcinolone 0.1 % cream   Commonly known as:  KENALOG   Used for:  Atopic neurodermatitis        Apply to AA BID x 2-3 weeks then PRN   Quantity:  454 g   Refills:  1       TYLENOL PO        Dose:  500 mg   Take 500 mg by mouth every 4 hours as needed for mild pain or fever   Refills:  0       VENTOLIN  (90 BASE) MCG/ACT Inhaler   Generic drug:  albuterol        Dose:  2 puff   Inhale 2 puffs into the lungs   Refills:  0            Where to get your medicines      Some of these will need a paper prescription and others can be bought over the counter. Ask your nurse if you have questions.     Bring a paper prescription for each of these medications     HYDROcodone-acetaminophen 5-325 MG per tablet                Protect others around you: Learn how to safely use, store and throw away your medicines at www.disposemymeds.org.             Medication List: This is a list of all your medications and when to take them. Check marks below indicate your daily home schedule. Keep this list as a reference.      Medications           Morning Afternoon Evening Bedtime As Needed    ALEVE PO   Take 220 mg by mouth daily                                HYDROcodone-acetaminophen 5-325 MG per tablet   Commonly known as:  NORCO   Take 1-2 tablets by mouth every 6 hours as needed   Last time this was given:  1 tablet on 5/4/2017  2:08 PM   Notes to Patient:  You had 1 pain pill at 2:15 pm. You had 1 vistaril at 2:15 pm.                                IBUPROFEN PO   Take 400 mg by mouth every 6 hours                                paragard intrauterine copper                                spironolactone 100 MG tablet   Commonly known as:  ALDACTONE   Take 1 tablet (100 mg) by mouth daily Overdue for recheck Derm appt: [phone] to  schedule.                                triamcinolone 0.1 % cream   Commonly known as:  KENALOG   Apply to AA BID x 2-3 weeks then PRN                                TYLENOL PO   Take 500 mg by mouth every 4 hours as needed for mild pain or fever                                VENTOLIN  (90 BASE) MCG/ACT Inhaler   Inhale 2 puffs into the lungs   Generic drug:  albuterol

## 2017-05-04 NOTE — ANESTHESIA POSTPROCEDURE EVALUATION
Patient: July Delaney    Procedure(s):  Laparoscopic Cholecystectomy - Wound Class: II-Clean Contaminated    Diagnosis:Biliary dyskinesia  Diagnosis Additional Information: No value filed.    Anesthesia Type:  No value filed.    Note:  Anesthesia Post Evaluation    Patient location during evaluation: Bedside  Patient participation: Able to fully participate in evaluation  Level of consciousness: awake and alert  Airway patency: patent  Cardiovascular status: acceptable  Respiratory status: acceptable  Hydration status: acceptable  PONV: controlled     Anesthetic complications: None          Last vitals:  Vitals:    05/04/17 1400 05/04/17 1415 05/04/17 1430   BP: 119/69 119/75 120/65   Resp: 16 16 16   Temp:      SpO2: 99% 100% 96%         Electronically Signed By: ASHER Hernandez CRNA  May 4, 2017  2:57 PM

## 2017-05-04 NOTE — DISCHARGE INSTRUCTIONS
Same Day Surgery Discharge Instructions  Special Precautions After Surgery - Adult    1. It is not unusual to feel lightheaded or faint, up to 24 hours after surgery or while taking pain medication.  If you have these symptoms; sit for a few minutes before standing and have someone assist you when getting up.  2. You should rest and relax for the next 24 hours and must have someone stay with you for at least 24 hours after your discharge.  3. DO NOT DRIVE any vehicle or operate mechanical equipment for 24 hours following the end of your surgery.  DO NOT DRIVE while taking narcotic pain medications that have been prescribed by your physician.  If you had a limb operated on, you must be able to use it fully to drive.  4. DO NOT drink alcoholic beverages for 24 hours following surgery or while taking prescription pain medication.  5. Drink clear liquids (apple juice, ginger ale, broth, 7-Up, etc.).  Progress to your regular diet as you feel able.  6. Any questions call your physician and do not make important decisions for 24 hours.      Discharge instructions for Patient with Scopolamine Transdermal Patch    1.  You may leave the patch on behind your ear for three days-But NO LONGER.  May have withdrawal symptoms (nausea, vomiting, headache,dizziness) if used longer.  2.  When you remove the patch, you must wash and dry your hands thoroughly and before touching your eyes, as pupils may dilate.  3.  Discard patch (away from children and pets).  4.  May develop some urinary hesitancy or urine retention.  5.  Patch should be removed by:___tomorrow pm.___       Surgery Specialty Clinic:  [phone]     Same Day Surgery [phone], Monday thru Friday 6am-9pm.        Wash incisions daily with soap and water. Some mild redness or swelling is expected. If draining, cover with dry gauze.    No heavy lifting (less than 30 pounds) for 1 month.    Okay to use ice pack over wound as necessary for  comfort.    Use pain medication as necessary but avoid constipating side effects. Ibuprofen okay but avoid Tylenol as your pain medication already contains this.    Diet as tolerated. No restrictions.    Follow up with Dr Tamez in 1-2 weeks.    Most people take the rest of the week off and return to work the following Monday. You may return sooner as pain allows. During your follow-up appointment, Dr. Tamez will give you a formal letter for your work with any restrictions detailed.  All disability or other such paperwork will be addressed at that time.

## 2017-05-04 NOTE — ANESTHESIA CARE TRANSFER NOTE
Patient: July Delaney    Procedure(s):  Laparoscopic Cholecystectomy - Wound Class: II-Clean Contaminated    Diagnosis: Biliary dyskinesia  Diagnosis Additional Information: No value filed.    Anesthesia Type:   No value filed.     Note:  Airway :Nasal Cannula  Patient transferred to:PACU        Vitals: (Last set prior to Anesthesia Care Transfer)    CRNA VITALS  5/4/2017 1139 - 5/4/2017 1213      5/4/2017             Pulse: 121    SpO2: 100 %                Electronically Signed By: ASHER Hernandez CRNA  May 4, 2017  12:13 PM

## 2017-05-08 LAB — COPATH REPORT: NORMAL

## 2017-05-09 ENCOUNTER — OFFICE VISIT (OUTPATIENT)
Dept: FAMILY MEDICINE | Facility: CLINIC | Age: 32
End: 2017-05-09
Payer: COMMERCIAL

## 2017-05-09 ENCOUNTER — MYC MEDICAL ADVICE (OUTPATIENT)
Dept: FAMILY MEDICINE | Facility: CLINIC | Age: 32
End: 2017-05-09

## 2017-05-09 VITALS
SYSTOLIC BLOOD PRESSURE: 110 MMHG | OXYGEN SATURATION: 96 % | HEIGHT: 68 IN | BODY MASS INDEX: 23.86 KG/M2 | DIASTOLIC BLOOD PRESSURE: 75 MMHG | WEIGHT: 157.4 LBS | HEART RATE: 84 BPM | TEMPERATURE: 98.3 F

## 2017-05-09 DIAGNOSIS — R30.9 PAIN PASSING URINE: Primary | ICD-10-CM

## 2017-05-09 LAB
ALBUMIN UR-MCNC: NEGATIVE MG/DL
APPEARANCE UR: CLEAR
BILIRUB UR QL STRIP: NEGATIVE
COLOR UR AUTO: YELLOW
GLUCOSE UR STRIP-MCNC: NEGATIVE MG/DL
HGB UR QL STRIP: NEGATIVE
KETONES UR STRIP-MCNC: NEGATIVE MG/DL
LEUKOCYTE ESTERASE UR QL STRIP: NEGATIVE
NITRATE UR QL: NEGATIVE
NON-SQ EPI CELLS #/AREA URNS LPF: ABNORMAL /LPF
PH UR STRIP: 7 PH (ref 5–7)
RBC #/AREA URNS AUTO: ABNORMAL /HPF (ref 0–2)
SP GR UR STRIP: 1.01 (ref 1–1.03)
URN SPEC COLLECT METH UR: ABNORMAL
UROBILINOGEN UR STRIP-ACNC: 0.2 EU/DL (ref 0.2–1)
WBC #/AREA URNS AUTO: ABNORMAL /HPF (ref 0–2)

## 2017-05-09 PROCEDURE — 99213 OFFICE O/P EST LOW 20 MIN: CPT | Performed by: FAMILY MEDICINE

## 2017-05-09 PROCEDURE — 81001 URINALYSIS AUTO W/SCOPE: CPT | Performed by: FAMILY MEDICINE

## 2017-05-09 RX ORDER — SULFAMETHOXAZOLE AND TRIMETHOPRIM 400; 80 MG/1; MG/1
1 TABLET ORAL 2 TIMES DAILY
Qty: 10 TABLET | Refills: 0 | Status: SHIPPED | OUTPATIENT
Start: 2017-05-09 | End: 2017-07-13

## 2017-05-09 NOTE — MR AVS SNAPSHOT
After Visit Summary   5/9/2017    July Delaney    MRN: [id number]           Patient Information     Date Of Birth          1985        Visit Information        Provider Department      5/9/2017 3:20 PM Francisco Javier Rivera MD [facility]        Today's Diagnoses     Pain passing urine    -  1       Follow-ups after your visit        Your next 10 appointments already scheduled     May 11, 2017 11:15 AM CDT   New Visit with Kristin Sharma GC   Cancer Risk Management Program ([facility])    [facility] [place]  [address]  [place] [id number] [phone]              Who to contact     If you have questions or need follow up information about today's clinic visit or your schedule please contact [facility] directly at [phone].  Normal or non-critical lab and imaging results will be communicated to you by MyChart, letter or phone within 4 business days after the clinic has received the results. If you do not hear from us within 7 days, please contact the clinic through MyChart or phone. If you have a critical or abnormal lab result, we will notify you by phone as soon as possible.  Submit refill requests through [facility] or call your pharmacy and they will forward the refill request to us. Please allow 3 business days for your refill to be completed.          Additional Information About Your Visit        MyChart Information     [facility] gives you secure access to your electronic health record. If you see a primary care provider, you can also send messages to your care team and make appointments. If you have questions, please call your primary care clinic.  If you do not have a primary care provider, please call [phone] and they will assist you.        Care EveryWhere ID     This is your Care EveryWhere ID. This could be used by other organizations to access your [place] medical records  [id number]       "  Your Vitals Were     Pulse Temperature Height Last Period Pulse Oximetry BMI (Body Mass Index)    84 98.3  F (36.8  C) (Tympanic) 5' 7.5\" (1.715 m) 04/14/2017 96% 24.29 kg/m2       Blood Pressure from Last 3 Encounters:   05/09/17 110/75   05/04/17 120/65   04/28/17 115/70    Weight from Last 3 Encounters:   05/09/17 157 lb 6.4 oz (71.4 kg)   05/04/17 153 lb (69.4 kg)   04/28/17 153 lb (69.4 kg)              We Performed the Following     UA with Microscopic          Today's Medication Changes          These changes are accurate as of: 5/9/17  3:59 PM.  If you have any questions, ask your nurse or doctor.               Start taking these medicines.        Dose/Directions    sulfamethoxazole-trimethoprim 400-80 MG per tablet   Commonly known as:  BACTRIM/SEPTRA   Used for:  Pain passing urine   Started by:  Francisco Javier Rivera MD        Dose:  1 tablet   Take 1 tablet by mouth 2 times daily   Quantity:  10 tablet   Refills:  0            Where to get your medicines      These medications were sent to [facility] [address] [place] [address]  [address] [facility] [address]     Phone:  [phone]     sulfamethoxazole-trimethoprim 400-80 MG per tablet                Primary Care Provider Office Phone # Fax #    Kristine ASHER Brown [phone]       [address] [address]        Thank you!     Thank you for choosing [facility]  for your care. Our goal is always to provide you with excellent care. Hearing back from our patients is one way we can continue to improve our services. Please take a few minutes to complete the written survey that you may receive in the mail after your visit with us. Thank you!             Your Updated Medication List - Protect others around you: Learn how to safely use, store and throw away your medicines at www.disposemymeds.org.          This list is accurate as of: " 5/9/17  3:59 PM.  Always use your most recent med list.                   Brand Name Dispense Instructions for use    ALEVE PO      Take 220 mg by mouth daily       HYDROcodone-acetaminophen 5-325 MG per tablet    NORCO    45 tablet    Take 1-2 tablets by mouth every 6 hours as needed       IBUPROFEN PO      Take 400 mg by mouth every 6 hours       paragard intrauterine copper          spironolactone 100 MG tablet    ALDACTONE    30 tablet    Take 1 tablet (100 mg) by mouth daily Overdue for recheck Derm appt: [phone] to schedule.       sulfamethoxazole-trimethoprim 400-80 MG per tablet    BACTRIM/SEPTRA    10 tablet    Take 1 tablet by mouth 2 times daily       triamcinolone 0.1 % cream    KENALOG    454 g    Apply to AA BID x 2-3 weeks then PRN       TYLENOL PO      Take 500 mg by mouth every 4 hours as needed for mild pain or fever       VENTOLIN  (90 BASE) MCG/ACT Inhaler   Generic drug:  albuterol      Inhale 2 puffs into the lungs

## 2017-05-09 NOTE — PROGRESS NOTES
SUBJECTIVE:                                                    July Delaney is a 31 year old female who presents to clinic today for the following health issues: recent GB surgery and now with some UTI symptoms.      URINARY TRACT SYMPTOMS      Duration: Since Friday     Description  dysuria, frequency, urgency and patient  had Gall bladder taken out on Thursday symptoms started the following day    Intensity:  Mild 3/10    Accompanying signs and symptoms:  Fever/chills: no   Flank pain no   Nausea and vomiting: no   Vaginal symptoms: discharge and odor  Abdominal/Pelvic Pain: YES- just had laporscopic surgery    History  History of frequent UTI's: YES  History of kidney stones: no   Sexually Active: YES- not at this time due to surgery  Possibility of pregnancy: No    Precipitating or alleviating factors: None    Therapies tried and outcome: ibuprofen and Tylenol   Outcome: some relief       Problem list and histories reviewed & adjusted, as indicated.  Additional history:     Patient Active Problem List   Diagnosis     Horseshoe kidney     Family history of malignant neoplasm of breast     Past Surgical History:   Procedure Laterality Date     CHOLECYSTECTOMY       LAPAROSCOPIC CHOLECYSTECTOMY N/A 5/4/2017    Procedure: LAPAROSCOPIC CHOLECYSTECTOMY;  Laparoscopic Cholecystectomy;  Surgeon: Modesto Tamez MD;  Location: [facility] OR       Social History   Substance Use Topics     Smoking status: Former Smoker     Smokeless tobacco: Not on file     Alcohol use Yes     Family History   Problem Relation Age of Onset     Uterine Cancer Maternal Grandmother      Breast Cancer Maternal Aunt      Breast Cancer Maternal Aunt          Current Outpatient Prescriptions   Medication Sig Dispense Refill     sulfamethoxazole-trimethoprim (BACTRIM/SEPTRA) 400-80 MG per tablet Take 1 tablet by mouth 2 times daily 10 tablet 0     IBUPROFEN PO Take 400 mg by mouth every 6 hours       Naproxen Sodium (ALEVE PO) Take 220 mg by mouth daily  "      Acetaminophen (TYLENOL PO) Take 500 mg by mouth every 4 hours as needed for mild pain or fever       spironolactone (ALDACTONE) 100 MG tablet Take 1 tablet (100 mg) by mouth daily Overdue for recheck Derm appt: [phone] to schedule. 30 tablet 0     paragard intrauterine copper        albuterol (VENTOLIN HFA) 108 (90 BASE) MCG/ACT inhaler Inhale 2 puffs into the lungs       triamcinolone (KENALOG) 0.1 % cream Apply to AA BID x 2-3 weeks then  g 1     HYDROcodone-acetaminophen (NORCO) 5-325 MG per tablet Take 1-2 tablets by mouth every 6 hours as needed (Patient not taking: Reported on 5/9/2017) 45 tablet 0       Reviewed and updated as needed this visit by clinical staff       Reviewed and updated as needed this visit by Provider         ROS:  C: NEGATIVE for fever, chills, change in weight  E/M: NEGATIVE for ear, mouth and throat problems  R: NEGATIVE for significant cough or SOB  CV: NEGATIVE for chest pain, palpitations or peripheral edema    OBJECTIVE:                                                    /75 (BP Location: Right arm, Patient Position: Chair, Cuff Size: Adult Regular)  Pulse 84  Temp 98.3  F (36.8  C) (Tympanic)  Ht 5' 7.5\" (1.715 m)  Wt 157 lb 6.4 oz (71.4 kg)  LMP 04/14/2017  SpO2 96%  BMI 24.29 kg/m2  Body mass index is 24.29 kg/(m^2).  GENERAL: healthy, alert and no distress  NECK: no adenopathy, no asymmetry, masses, or scars and thyroid normal to palpation  ABDOMEN: soft, nontender, no hepatosplenomegaly, no masses and bowel sounds normal  MS: no gross musculoskeletal defects noted, no edema  MS:          ASSESSMENT/PLAN:                                                            1. Pain passing urine  Mild early UTI.   - UA with Microscopic  - sulfamethoxazole-trimethoprim (BACTRIM/SEPTRA) 400-80 MG per tablet; Take 1 tablet by mouth 2 times daily  Dispense: 10 tablet; Refill: 0        Francisco Javier Rivera MD  [facility]  "

## 2017-05-09 NOTE — NURSING NOTE
"No chief complaint on file.      Initial LMP 02/24/2017 (Exact Date) Estimated body mass index is 23.49 kg/(m^2) as calculated from the following:    Height as of 4/4/17: 5' 7\" (1.702 m).    Weight as of 4/4/17: 150 lb (68 kg).  Medication Reconciliation: complete     Yulia Mena CMA (AAMA)  "

## 2017-05-11 ENCOUNTER — ONCOLOGY VISIT (OUTPATIENT)
Dept: ONCOLOGY | Facility: CLINIC | Age: 32
End: 2017-05-11
Attending: GENETIC COUNSELOR, MS
Payer: COMMERCIAL

## 2017-05-11 DIAGNOSIS — Z80.49 FAMILY HISTORY OF UTERINE CANCER: ICD-10-CM

## 2017-05-11 DIAGNOSIS — Z80.3 FAMILY HISTORY OF MALIGNANT NEOPLASM OF BREAST: Primary | ICD-10-CM

## 2017-05-11 PROBLEM — Z80.42 FAMILY HISTORY OF PROSTATE CANCER: Status: ACTIVE | Noted: 2017-05-11

## 2017-05-11 LAB — MISCELLANEOUS TEST: NORMAL

## 2017-05-11 PROCEDURE — 36415 COLL VENOUS BLD VENIPUNCTURE: CPT

## 2017-05-11 PROCEDURE — 96040 ZZH GENETIC COUNSELING, EACH 30 MINUTES: CPT

## 2017-05-11 NOTE — LETTER
Cancer Risk Management  Program Locations    [facility]  [facility]  [facility]  Mailing Address  Cancer Risk Management Program  [facility]  [address]  [place] [address]    New patient appointments  [phone]  May 11, 2017    July Vekailees  [address] [place]  [place] [address]      Dear July,  It was a pleasure to meet you. This is a summary of your genetic counseling visit.      5/11/2017    Referring Provider: Eveline Frye MD    Presenting Information:   I met with July Delaney today for genetic counseling at the Cancer Risk Management Program at the [facility] to discuss her  family history of breast and uterine cancer.  She is here today to review this history, cancer screening recommendations, and available genetic testing options.    Personal History:  July is a 31 year old female.  She does not have any personal history of cancer.       She had her first menstrual period at age 16 and  her first child at age 19.  July has her ovaries, fallopian tubes and uterus in place.   Her most recent OB-GYN exam and Pap smear in April 2017 and were normal. She had a normal clinical breast exam at that time, too. In April of 2017, she had a normal mammogram because of family history of breast cancer. July has not had a colonoscopy. She plans to have a dermatology exam this year. July also has a horseshoe kidney    Family History: (Please see scanned pedigree for detailed family history information)    A maternal aunt was diagnosed with breast cancer at age 33. She had a unilateral radical mastectomy. She is now 66.    Another maternal aunt was diagnosed with breast cancer at age 29 and passed away at age 33.    Her mother had a prophylactic mastectomy at age 30  because of her family history.  She is now 60 years old.    Her maternal grandmother was diagnosed with uterine cancer in her 50's. She  at 83 from Alzheimer's disease.    Two maternal great aunts, one related to her maternal grandmother and the other related to her maternal grandfather both had breast cancer at later ages.    Her paternal grandfather was diagnosed with prostate cancer in his 70's. He is now 84 years old.    Her maternal ethnicity is Samoan. Her paternal ethnicity is Scandinavian.  There is no known Ashkenazi Christian ancestry on either side of her family.     Discussion:    July's family history of two maternal aunts diagnosed with breast cancer at a young age,  may is suggestive of a hereditary cancer syndrome.     No one has had genetic testing for hereditary cancer in July's family. Her mother and maternal aunt are unavailable for testing at this time.    We discussed the natural history and genetics of the BRCA1 and BRCA2 genes. We discussed that there are additional genes that could cause increased risk of breast and uterine cancer.  As many of these genes present with overlapping features in a family, it would be reasonable for July to consider panel genetic testing to analyze multiple genes at once. The most appropriate panel would be the OvaNext panel for hereditary breast and GYN cancers.  Genetic testing is available for 25 genes associated with hereditary breast and  gynecologic cancers: OvaNext (KILO, BARD1, BRCA1, BRCA2, BRIP1, CDH1, CHEK2, DICER1, EPCAM, MLH1, MRE11A, MSH2, MSH6, MUTYH, NBN, NF1, PALB2, PMS2, PTEN, RAD50, RAD51C, RAD51D, SMARCA4, STK11, and TP53).  We discussed that some of the genes in the OvaNext panel are associated with specific hereditary cancer syndromes and have published management guidelines: Hereditary Breast and Ovarian Cancer syndrome (BRCA1, BRCA2), Calzada syndrome (MLH1, MSH2, MSH6, PMS2, EPCAM), Hereditary Diffuse Gastric Cancer (CDH1), Cowden syndrome (PTEN), Li Fraumeni  syndrome (TP53), Peutz-Jeghers syndrome (STK11), MUTYH Associated Polyposis syndrome (MUTYH), and Neurofibromatosis type 1 (NF1).    Risk-reducing salpingo-oophorectomy can be considered in women with mutations in BRIP1, RAD51C, or RAD51D.  Breast cancer risk management guidelines are available for KILO, CHEK2, PALB2, NF1, and NBN.  The remaining genes (BARD1, DICER1, MRE11A, RAD50, and SMARCA4) are associated with increased cancer risk and may allow us to make medical recommendations when mutations are identified.     A detailed handout regarding these gene and the information we discussed was provided to July at the end of our appointment today and can be found in the after visit summary.  Topics included: inheritance pattern, cancer risks, cancer screening recommendations, and also risks, benefits and limitations of testing. uJly also received a list of the genes and associated cancers from [facility].  Consent was obtained and genetic testing for OvaNext was sent to [facility]. Turn around time: approximately 4 weeks.    Based on her personal and family history, July meets current National Comprehensive Cancer Network (NCCN) criteria for genetic testing of BRCA1 and BRCA2 testing.  She has two maternal aunts diagnosed with breast cancer under the age of 50.       Medical Management: The information from genetic testing may determine additional cancer screening recommendations (i.e. mammogram and breast MRI, more frequent colonoscopies, annual dermatologic exams, etc.) as well as options for risk reducing surgeries (i.e. bilateral mastectomy, surgery to remove the ovaries and/or uterus, etc.) for July and her relatives.  These recommendations will be discussed in detail once genetic testing is completed. The screening is specific to the gene identified with a mutation on the panel.    Plan:  1) Today July elected to proceed with the OvaNext Panel for hereditary cancer.  2) This  information should be available in 4-6 weeks.  3) July will return to clinic to discuss the results    Face to face time: 45 minutes        Please call me if you have any additional questions.    Sincerely,    Kristin Sharma, MS [facility]  Genetic Counselor  [phone] (phone)

## 2017-05-11 NOTE — PROGRESS NOTES
Medical Assistant Note:  July Elaina presents today for lab visit.    Patient seen by provider today: Yes: Seen by Kristin Sharma.   present during visit today: Not Applicable.    Concerns: No Concerns.    Procedure:  Lab draw site: RAC, Needle type: BF, Gauge: 21 g gauze and coban applied.    Post Assessment:  Labs drawn without difficulty: Yes.    Discharge Plan:  Departure Mode: Ambulatory.    Face to Face Time: 5.    Shani Yates MA

## 2017-05-11 NOTE — MR AVS SNAPSHOT
After Visit Summary   5/11/2017    July Delaney    MRN: [id number]           Patient Information     Date Of Birth          1985        Visit Information        Provider Department      5/11/2017 11:15 AM Kristin Sharma [facility] Cancer Risk Management Program        Care Instructions          Assessing Cancer Risk  Only about 5-10% of cancers are thought to be due to an inherited cancer susceptibility gene.    These families often have:    Several people with the same or related types of cancer    Cancers diagnosed at a young age (before age 50)    Individuals with more than one primary cancer    Multiple generations of the family affected with cancer    Some people may be candidates for genetic testing of more than one gene.  For these families, genetic testing using a cancer panel may be offered.  These panels can test many genes at once known to increase the risk for gynecologic (and other) cancers:  BRCA1, BRCA2, BRIP1 MLH1, MSH2, MSH6, PMS2, EPCAM, PTEN, PALB2, RAD51C, RAD51D, and TP53. The purpose of this handout is to serve as a brief summary of the gynecologic cancer risk genes that have published clinical management guidelines for individuals who are found to carry a mutation.    ______________________________________________________________________________  Hereditary Breast and Ovarian Cancer Syndrome   (BRCA1 and BRCA2)  A single mutation in one of the copies of BRCA1 or BRCA2 increases the risk for breast and ovarian cancer, among others.  The risk for pancreatic cancer and melanoma may also be slightly increased in some families.  The chart below shows the chance that someone with a BRCA mutation would develop cancer in his or her lifetime1,2,3,4.        A person s ethnic background is also important to consider, as individuals of Ashkenazi Worship ancestry have a higher chance of having a BRCA gene mutation.  There are three BRCA mutations that occur more frequently in this  population.    Calzada Syndrome   (MLH1, MSH2, MSH6, PMS2, and EPCAM)  Currently five genes are known to cause Calzada Syndrome: MLH1, MSH2, MSH6, PMS2, and EPCAM.  A single mutation in one of the Calzada Syndrome genes increases the risk for colon, endometrial, ovarian, and stomach cancers.  Other cancers that occur less commonly in Calzada Syndrome include urinary tract, skin, and brain cancers.  The chart below shows the chance that a person with Calzada syndrome would develop cancer in his or her lifetime7.      *Cancer risk varies depending on Calzada syndrome gene found    Cowden Syndrome   (PTEN)  Cowden syndrome is a hereditary condition that increases the risk for breast, thyroid, endometrial, and kidney cancer.  Cowden syndrome is caused by a mutation in the PTEN gene.  A single mutation in one of the copies of PTEN causes Cowden syndrome and increases cancer risk.  The chart below shows the chance that someone with a PTEN mutation would develop cancer in their lifetime5,6.  Other benign features seen in some individuals with Cowden syndrome include benign skin lesions (facial papules, keratoses, lipomas), learning disability, autism, thyroid nodules, colon polyps, and larger head size.      *One recent study found breast cancer risk to be increased to 85%  Li-Fraumeni Syndrome   (TP53)  Li-Fraumeni Syndrome (LFS) is a cancer predisposition syndrome caused by a mutation in the TP53 gene. A single mutation in one of the copies of TP53 increases the risk for multiple cancers. Individuals with LFS are at an increased risk for developing cancer at a young age. The general lifetime risk for development of cancer is 50% by age 30 and 90% by age 60.     Core Cancers: Sarcomas, Breast, Brain, Lung, Leukemias/Lymphomas, Adrenocortical carcinomas  Other Cancers: Gastrointestinal, Thyroid, Skin, Genitourinary    Additional Genes  PALB2  Mutations in PALB2 have been shown to increase the risk of breast cancer up to 33-58% in some  families; where individuals fall within this risk range is dependent upon family history. PALB2 mutations have also been associated with increased risk for pancreatic cancer, although this risk has not been quantified yet.  Individuals who inherit two PALB2 mutations--one from their mother and one from their father--have a condition called Fanconi Anemia.  This rare autosomal recessive condition is associated with short stature, developmental delay, bone marrow failure, and increased risk for childhood cancers.    BRIP1, RAD51C and RAD51D  Mutations in BRIP1, RAD51C, and RAD51D have been shown to increase the risk of ovarian cancer as well as female breast cancer.    ______________________________________________________________________________    Inheritance  All of the cancer syndromes reviewed above are inherited in an autosomal dominant pattern.  This means that if a parent has a mutation, each of his or her children will have a 50% chance of inheriting that same mutation.  Therefore, each child--male or female--would have a 50% chance of being at increased risk for developing cancer.      Image obtained from Genetics Home Reference, 2013     Mutations in some genes can occur de mont[place], which means that a person s mutation occurred for the first time in them and was not inherited from a parent.  Now that they have the mutation, however, it can be passed on to future generations.    Genetic Testing  Genetic testing involves a blood test and will look for any harmful mutations that are associated with increased cancer risk.  If possible, it is recommended that the person(s) who has had cancer be tested before other family members.  That person will give us the most useful information about whether or not a specific gene is associated with the cancer in the family.    Results  There are three possible results of genetic testing:    Positive--a harmful mutation was identified in one or more of the  genes    Negative--no mutation was identified in any of the 9 genes on this panel    Variant of unknown significance--a variation in one of the genes was identified, but it is unclear how this impacts cancer risk in the family    Advantages and Disadvantages   There are advantages and disadvantages to genetic testing.  Advantages    May clarify your cancer risk    Can help you make medical decisions    May explain the cancers in your family    May give useful information to your family members (if you share your results)    Disadvantages    Possible negative emotional impact of learning about inherited cancer risk    Uncertainty in interpreting a negative test result in some situations    Possible genetic discrimination concerns (see below)    Genetic Information Nondiscrimination Act (ALBERTINA)  AL[place]ERTINA is a federal law that protects individuals from health insurance or employment discrimination based on a genetic test result alone.  Although rare, there are currently no legal discrimination protections in terms of life insurance, long term care, or disability insurances.  Visit the National Human Genome Research [place] website to learn more.    Reducing Cancer Risk  Each of the genes listed within this handout have nationally recognized cancer screening guidelines that would be recommended for individuals who test positive.  In addition to increased cancer screening, surgeries may be offered or recommended to reduce cancer risk.  Recommendations are based upon an individual s genetic test result as well as their personal and family history of cancer.    Questions to Think About Regarding Genetic Testing:    What effect will the test result have on me and my relationship with my family members if I have an inherited gene mutation?  If I don t have a gene mutation?    Should I share my test results, and how will my family react to this news, which may also affect them?    Are my children ready to learn new information  that may one day affect their own health?        Hereditary Cancer Resources    FORCE: Facing Our Risk of Cancer Empowered facingourrisk.org   Bright Pink bebrightpink.org   Li-Fraumeni Syndrome Association lfsassociation.org   PTEN World PTENworld.com   Collaborative Group of the Americas on Inherited Colorectal Cancer (CGA) cgaicc.com http://www.facingourrisk.org/   Cancer Care cancercare.org   American Cancer Society (ACS) cancer.org   National Cancer Verbena (NCI) cancer.gov       Cancer Risk Management Program [phone] ([phone])  ? Kristin Sharma, MS, [facility]  [phone]  ? Belle Toy, MS, [facility]  [phone]  ? Marian Lama, MS, [facility]  [phone]  ? Xiomara Johns, MS, [facility]  [phone]    References  1. Katherine LAKHANI, Kamran PDP, Randall S, Bridger ESQUIVEL, Lisa JE, Yoav JL, Landen N, Earnestine H, Kandi O, Rosio A, Gianni B, Katiana P, Manfredrick S, Rebecca DM, Lima N, Sofia E, Hamlet H, Mikey E, Guero J, Gronwald J, Ashley B, Anirudh H, Thorlacius S, Eerola H, Miguel H, Aunshka K, Russell OP. Average risks of breast and ovarian cancer associated with BRCA1 or BRCA2 mutations detected in case series unselected for family history: a combined analysis of 222 studies. Am J Hum Nallely. 2003;72:1117-30.  2. Kristina N, Joann M, John G.  BRCA1 and BRCA2 Hereditary Breast and Ovarian Cancer. Gene Reviews online. 2013.  3. Mike YC, Sherine S, Luisa G, Garcia S. Breast cancer risk among male BRCA1 and BRCA2 mutation carriers. J Natl Cancer Inst. 2007;99:1811-4.  4. Norman KATZ, Christin I, Dustin J, Geo E, Sorin ER, Shea F. Risk of breast cancer in male BRCA2 carriers. J Med Nallely. 2010;47:710-1.  5. Faraz ONOFRE, Dillan J, Mara J, Jeffy LA, Jennifer MS, Eng C. Lifetime cancer risks in individuals with germline PTEN mutations. Clin Cancer Res. 2012;18:400-7.  6. Danii CONLEY. Cowden Syndrome: A Critical Review of the Clinical Literature. J Nallely . 2009:18:13-27.  7. National  [facility] Cancer Network. Clinical practice guidelines in oncology, colorectal cancer screening. Available online (registration required). 2015.  8. Katherine MORRIS et al. Breast-Cancer Risk in Families with Mutations in PALB2. NEJM. 2014; 371(6):497-506.              Follow-ups after your visit        Who to contact     If you have questions or need follow up information about today's clinic visit or your schedule please contact CANCER RISK MANAGEMENT PROGRAM directly at [phone].  Normal or non-critical lab and imaging results will be communicated to you by SIRS-Labhart, letter or phone within 4 business days after the clinic has received the results. If you do not hear from us within 7 days, please contact the clinic through [employer] or phone. If you have a critical or abnormal lab result, we will notify you by phone as soon as possible.  Submit refill requests through [facility] or call your pharmacy and they will forward the refill request to us. Please allow 3 business days for your refill to be completed.          Additional Information About Your Visit        [facility] Information     [facility] gives you secure access to your electronic health record. If you see a primary care provider, you can also send messages to your care team and make appointments. If you have questions, please call your primary care clinic.  If you do not have a primary care provider, please call [phone] and they will assist you.        Care EveryWhere ID     This is your Care EveryWhere ID. This could be used by other organizations to access your [facility] medical records  [id number]        Your Vitals Were     Last Period                   04/14/2017            Blood Pressure from Last 3 Encounters:   05/09/17 110/75   05/04/17 120/65   04/28/17 115/70    Weight from Last 3 Encounters:   05/09/17 71.4 kg (157 lb 6.4 oz)   05/04/17 69.4 kg (153 lb)   04/28/17 69.4 kg (153 lb)              Today, you had the following     No orders  found for display       Primary Care Provider Office Phone # Fax #    ASHER Hampton [phone]       [address] [address]        Thank you!     Thank you for choosing CANCER RISK MANAGEMENT PROGRAM  for your care. Our goal is always to provide you with excellent care. Hearing back from our patients is one way we can continue to improve our services. Please take a few minutes to complete the written survey that you may receive in the mail after your visit with us. Thank you!             Your Updated Medication List - Protect others around you: Learn how to safely use, store and throw away your medicines at www.disposemymeds.org.          This list is accurate as of: 5/11/17 12:13 PM.  Always use your most recent med list.                   Brand Name Dispense Instructions for use    ALEVE PO      Take 220 mg by mouth daily       HYDROcodone-acetaminophen 5-325 MG per tablet    NORCO    45 tablet    Take 1-2 tablets by mouth every 6 hours as needed       IBUPROFEN PO      Take 400 mg by mouth every 6 hours       paragard intrauterine copper          spironolactone 100 MG tablet    ALDACTONE    30 tablet    Take 1 tablet (100 mg) by mouth daily Overdue for recheck Derm appt: [phone] to schedule.       sulfamethoxazole-trimethoprim 400-80 MG per tablet    BACTRIM/SEPTRA    10 tablet    Take 1 tablet by mouth 2 times daily       triamcinolone 0.1 % cream    KENALOG    454 g    Apply to AA BID x 2-3 weeks then PRN       TYLENOL PO      Take 500 mg by mouth every 4 hours as needed for mild pain or fever       VENTOLIN  (90 BASE) MCG/ACT Inhaler   Generic drug:  albuterol      Inhale 2 puffs into the lungs

## 2017-05-11 NOTE — PATIENT INSTRUCTIONS
Assessing Cancer Risk  Only about 5-10% of cancers are thought to be due to an inherited cancer susceptibility gene.    These families often have:    Several people with the same or related types of cancer    Cancers diagnosed at a young age (before age 50)    Individuals with more than one primary cancer    Multiple generations of the family affected with cancer    Some people may be candidates for genetic testing of more than one gene.  For these families, genetic testing using a cancer panel may be offered.  These panels can test many genes at once known to increase the risk for gynecologic (and other) cancers:  BRCA1, BRCA2, BRIP1 MLH1, MSH2, MSH6, PMS2, EPCAM, PTEN, PALB2, RAD51C, RAD51D, and TP53. The purpose of this handout is to serve as a brief summary of the gynecologic cancer risk genes that have published clinical management guidelines for individuals who are found to carry a mutation.    ______________________________________________________________________________  Hereditary Breast and Ovarian Cancer Syndrome   (BRCA1 and BRCA2)  A single mutation in one of the copies of BRCA1 or BRCA2 increases the risk for breast and ovarian cancer, among others.  The risk for pancreatic cancer and melanoma may also be slightly increased in some families.  The chart below shows the chance that someone with a BRCA mutation would develop cancer in his or her lifetime1,2,3,4.        A person s ethnic background is also important to consider, as individuals of Ashkenazi Rastafari ancestry have a higher chance of having a BRCA gene mutation.  There are three BRCA mutations that occur more frequently in this population.    Calzada Syndrome   (MLH1, MSH2, MSH6, PMS2, and EPCAM)  Currently five genes are known to cause Calzada Syndrome: MLH1, MSH2, MSH6, PMS2, and EPCAM.  A single mutation in one of the Calzada Syndrome genes increases the risk for colon, endometrial, ovarian, and stomach cancers.  Other cancers that occur  less commonly in Calzada Syndrome include urinary tract, skin, and brain cancers.  The chart below shows the chance that a person with Calzada syndrome would develop cancer in his or her lifetime7.      *Cancer risk varies depending on Calzada syndrome gene found    Cowden Syndrome   (PTEN)  Cowden syndrome is a hereditary condition that increases the risk for breast, thyroid, endometrial, and kidney cancer.  Cowden syndrome is caused by a mutation in the PTEN gene.  A single mutation in one of the copies of PTEN causes Cowden syndrome and increases cancer risk.  The chart below shows the chance that someone with a PTEN mutation would develop cancer in their lifetime5,6.  Other benign features seen in some individuals with Cowden syndrome include benign skin lesions (facial papules, keratoses, lipomas), learning disability, autism, thyroid nodules, colon polyps, and larger head size.      *One recent study found breast cancer risk to be increased to 85%  Li-Fraumeni Syndrome   (TP53)  Li-Fraumeni Syndrome (LFS) is a cancer predisposition syndrome caused by a mutation in the TP53 gene. A single mutation in one of the copies of TP53 increases the risk for multiple cancers. Individuals with LFS are at an increased risk for developing cancer at a young age. The general lifetime risk for development of cancer is 50% by age 30 and 90% by age 60.     Core Cancers: Sarcomas, Breast, Brain, Lung, Leukemias/Lymphomas, Adrenocortical carcinomas  Other Cancers: Gastrointestinal, Thyroid, Skin, Genitourinary    Additional Genes  PALB2  Mutations in PALB2 have been shown to increase the risk of breast cancer up to 33-58% in some families; where individuals fall within this risk range is dependent upon family history. PALB2 mutations have also been associated with increased risk for pancreatic cancer, although this risk has not been quantified yet.  Individuals who inherit two PALB2 mutations--one from their mother and one from their  father--have a condition called Fanconi Anemia.  This rare autosomal recessive condition is associated with short stature, developmental delay, bone marrow failure, and increased risk for childhood cancers.    BRIP1, RAD51C and RAD51D  Mutations in BRIP1, RAD51C, and RAD51D have been shown to increase the risk of ovarian cancer as well as female breast cancer.    ______________________________________________________________________________    Inheritance  All of the cancer syndromes reviewed above are inherited in an autosomal dominant pattern.  This means that if a parent has a mutation, each of his or her children will have a 50% chance of inheriting that same mutation.  Therefore, each child--male or female--would have a 50% chance of being at increased risk for developing cancer.      Image obtained from Genetics Home Reference, 2013     Mutations in some genes can occur de [place], which means that a person s mutation occurred for the first time in them and was not inherited from a parent.  Now that they have the mutation, however, it can be passed on to future generations.    Genetic Testing  Genetic testing involves a blood test and will look for any harmful mutations that are associated with increased cancer risk.  If possible, it is recommended that the person(s) who has had cancer be tested before other family members.  That person will give us the most useful information about whether or not a specific gene is associated with the cancer in the family.    Results  There are three possible results of genetic testing:    Positive--a harmful mutation was identified in one or more of the genes    Negative--no mutation was identified in any of the 9 genes on this panel    Variant of unknown significance--a variation in one of the genes was identified, but it is unclear how this impacts cancer risk in the family    Advantages and Disadvantages   There are advantages and disadvantages to genetic  testing.  Advantages    May clarify your cancer risk    Can help you make medical decisions    May explain the cancers in your family    May give useful information to your family members (if you share your results)    Disadvantages    Possible negative emotional impact of learning about inherited cancer risk    Uncertainty in interpreting a negative test result in some situations    Possible genetic discrimination concerns (see below)    Genetic Information Nondiscrimination Act (ALBERTINA)  AL[place]ERTINA is a federal law that protects individuals from health insurance or employment discrimination based on a genetic test result alone.  Although rare, there are currently no legal discrimination protections in terms of life insurance, long term care, or disability insurances.  Visit the [employer] website to learn more.    Reducing Cancer Risk  Each of the genes listed within this handout have nationally recognized cancer screening guidelines that would be recommended for individuals who test positive.  In addition to increased cancer screening, surgeries may be offered or recommended to reduce cancer risk.  Recommendations are based upon an individual s genetic test result as well as their personal and family history of cancer.    Questions to Think About Regarding Genetic Testing:    What effect will the test result have on me and my relationship with my family members if I have an inherited gene mutation?  If I don t have a gene mutation?    Should I share my test results, and how will my family react to this news, which may also affect them?    Are my children ready to learn new information that may one day affect their own health?        Hereditary Cancer Resources    FORCE: Facing Our Risk of Cancer Empowered facingourrisk.org   Bright Pink bebrightpink.org   Li-Fraumeni Syndrome Association lfsassociation.org   PTEN World PTENworld.com   Collaborative Group of the Americas on Inherited  Colorectal Cancer (CGA) [facility]anes [email or web address].com http://[email or web address] [email or web address]/   Cancer Care cancercare.org   American Cancer Society (ACS) cancer.org   National Cancer [place] (NCI) cancer.gov       Cancer Risk Management Program [phone]-[phone]-CANCER ([phone])  ? Kristin Sharma, MS, [facility]  [phone]  ? Belle Yeager, MS, [facility]  [phone]  ? Marian Lama, MS, [facility]  [phone]  ? Xiomara Johns, MS, [facility]  [phone]    References  1. Katherine A, Kamran PDP, Randall S, Bridger ESQUIVEL, Lisa JE, Yoav JL, Landen N, Earnestine H, Kandi O, Rosio A, Gianni B, Katiana P, Isha S, Rebecca DM, Clarence N, Sofia E, Hamlet H, Mikey E, Guero J, Georgie J, Ashley B, Anirudh H, Thorlacius S, Eerola H, Miguel H, Anushka K, Russell OP. Average risks of breast and ovarian cancer associated with BRCA1 or BRCA2 mutations detected in case series unselected for family history: a combined analysis of 222 studies. Am J Hum Nallely. 2003;72:1117-30.  2. Kristina BELLE, Joann M, John G.  BRCA1 and BRCA2 Hereditary Breast and Ovarian Cancer. Gene Reviews online. 2013.  3. Mike YC, Sherine S, Luisa G, Garcia S. Breast cancer risk among male BRCA1 and BRCA2 mutation carriers. J Natl Cancer Inst. 2007;99:1811-4.  4. Norman DG, Christin I, Dustin J, Geo E, Sorin ER, hSea F. Risk of breast cancer in male BRCA2 carriers. J Med Nallely. 2010;47:710-1.  5. Faraz ONOFRE, Dillan J, Mara J, Jeffy SULLIVAN, Jennifer MS, Adriana C. Lifetime cancer risks in individuals with germline PTEN mutations. Clin Cancer Res. 2012;18:400-7.  6. Danii CONLEY. Cowden Syndrome: A Critical Review of the Clinical Literature. J Nallely . 2009:18:13-27.  7. National Comprehensive Cancer Network. Clinical practice guidelines in oncology, colorectal cancer screening. Available online (registration required). 2015.  8. Katherine MORRIS et al. Breast-Cancer Risk in Families with Mutations in PALB2. NEJM. 2014; 371(6):497-506.

## 2017-05-12 NOTE — PROGRESS NOTES
2017    Referring Provider: Eveline Frye MD    Presenting Information:   I met with July Delaney today for genetic counseling at the Cancer Risk Management Program at the [facility] to discuss her  family history of breast and uterine cancer.  She is here today to review this history, cancer screening recommendations, and available genetic testing options.    Personal History:  July is a 31 year old female.  She does not have any personal history of cancer.       She had her first menstrual period at age 16 and  her first child at age 19.  July has her ovaries, fallopian tubes and uterus in place.   Her most recent OB-GYN exam and Pap smear in 2017 and were normal. She had a normal clinical breast exam at that time, too. In 2017, she had a normal mammogram because of family history of breast cancer. July has not had a colonoscopy. She plans to have a dermatology exam this year. July also has a horseshoe kidney    Family History: (Please see scanned pedigree for detailed family history information)    A maternal aunt was diagnosed with breast cancer at age 33. She had a unilateral radical mastectomy. She is now 66.    Another maternal aunt was diagnosed with breast cancer at age 29 and passed away at age 33.    Her mother had a prophylactic mastectomy at age 30  because of her family history. She is now 60 years old.    Her maternal grandmother was diagnosed with uterine cancer in her 50's. She  at 83 from Alzheimer's disease.    Two maternal great aunts, one related to her maternal grandmother and the other related to her maternal grandfather both had breast cancer at later ages.    Her paternal grandfather was diagnosed with prostate cancer in his 70's. He is now 84 years old.    Her maternal ethnicity is Citizen of [place]. Her paternal ethnicity is Scandinavian.  There is no known Ashkenazi Mu-ism ancestry on either side of her family.     Discussion:    July's family history  of two maternal aunts diagnosed with breast cancer at a young age,  may is suggestive of a hereditary cancer syndrome.     No one has had genetic testing for hereditary cancer in July's family. Her mother and maternal aunt are unavailable for testing at this time.    We discussed the natural history and genetics of the BRCA1 and BRCA2 genes. We discussed that there are additional genes that could cause increased risk of breast and uterine cancer.  As many of these genes present with overlapping features in a family, it would be reasonable for July to consider panel genetic testing to analyze multiple genes at once. The most appropriate panel would be the OvaNext panel for hereditary breast and GYN cancers.  Genetic testing is available for 25 genes associated with hereditary breast and  gynecologic cancers: OvaNext (KILO, BARD1, BRCA1, BRCA2, BRIP1, CDH1, CHEK2, DICER1, EPCAM, MLH1, MRE11A, MSH2, MSH6, MUTYH, NBN, NF1, PALB2, PMS2, PTEN, RAD50, RAD51C, RAD51D, SMARCA4, STK11, and TP53).  We discussed that some of the genes in the OvaNext panel are associated with specific hereditary cancer syndromes and have published management guidelines: Hereditary Breast and Ovarian Cancer syndrome (BRCA1, BRCA2), Calzada syndrome (MLH1, MSH2, MSH6, PMS2, EPCAM), Hereditary Diffuse Gastric Cancer (CDH1), Cowden syndrome (PTEN), Li Fraumeni syndrome (TP53), Peutz-Jeghers syndrome (STK11), MUTYH Associated Polyposis syndrome (MUTYH), and Neurofibromatosis type 1 (NF1).    Risk-reducing salpingo-oophorectomy can be considered in women with mutations in BRIP1, RAD51C, or RAD51D.  Breast cancer risk management guidelines are available for KILO, CHEK2, PALB2, NF1, and NBN.  The remaining genes (BARD1, DICER1, MRE11A, RAD50, and SMARCA4) are associated with increased cancer risk and may allow us to make medical recommendations when mutations are identified.     A detailed handout regarding these gene and the information we discussed  was provided to July at the end of our appointment today and can be found in the after visit summary.  Topics included: inheritance pattern, cancer risks, cancer screening recommendations, and also risks, benefits and limitations of testing. July also received a list of the genes and associated cancers from [employer].  Consent was obtained and genetic testing for OvaNext was sent to [facility]. Turn around time: approximately 4 weeks.    Based on her personal and family history, July meets current National Comprehensive Cancer Network (NCCN) criteria for genetic testing of BRCA1 and BRCA2 testing.  She has two maternal aunts diagnosed with breast cancer under the age of 50.       Medical Management: The information from genetic testing may determine additional cancer screening recommendations (i.e. mammogram and breast MRI, more frequent colonoscopies, annual dermatologic exams, etc.) as well as options for risk reducing surgeries (i.e. bilateral mastectomy, surgery to remove the ovaries and/or uterus, etc.) for July and her relatives.  These recommendations will be discussed in detail once genetic testing is completed. The screening is specific to the gene identified with a mutation on the panel.    Plan:  1) Today July elected to proceed with the OvaNext Panel for hereditary cancer.  2) This information should be available in 4-6 weeks.  3) July will return to clinic to discuss the results    Face to face time: 45 minutes    Kristin Sharma MS [facility]  Genetic Counselor  [phone]

## 2017-05-30 ENCOUNTER — TELEPHONE (OUTPATIENT)
Dept: FAMILY MEDICINE | Facility: CLINIC | Age: 32
End: 2017-05-30

## 2017-05-30 NOTE — TELEPHONE ENCOUNTER
She is having some chest pain and some shortness of breath, she thinks it is a panic attack but this is a new diagnosis for her. Her mom says since she had surgery recently and is having chest pain, needs to go to the ED to rule out PE. July will go to ED for evaluation

## 2017-05-30 NOTE — TELEPHONE ENCOUNTER
Reason for call:  Patient reporting a symptom    Symptom or request: Pt says she is having chest pain. She had gall bladder surg a couple weeks ago and all is well with that. She is thinking she may be having a panic attack. States her mom is a nurse practioner and told her she should call to talk to her provider.   Phone Number patient can be reached at:  Home number on file [phone] (home)    Best Time:  Call to RN for triage    Call taken on 5/30/2017 at 9:58 AM by Margo Chase

## 2017-06-08 ENCOUNTER — ONCOLOGY VISIT (OUTPATIENT)
Dept: ONCOLOGY | Facility: CLINIC | Age: 32
End: 2017-06-08
Attending: GENETIC COUNSELOR, MS
Payer: COMMERCIAL

## 2017-06-08 DIAGNOSIS — Z80.3 FAMILY HISTORY OF MALIGNANT NEOPLASM OF BREAST: Primary | ICD-10-CM

## 2017-06-08 DIAGNOSIS — Z80.49 FAMILY HISTORY OF UTERINE CANCER: ICD-10-CM

## 2017-06-08 DIAGNOSIS — Z80.42 FAMILY HISTORY OF PROSTATE CANCER: ICD-10-CM

## 2017-06-08 PROCEDURE — 40000072 ZZH STATISTIC GENETIC COUNSELING, < 16 MIN: Performed by: GENETIC COUNSELOR, MS

## 2017-06-08 NOTE — LETTER
Cancer Risk Management  Program Locations    [facility]  Mailing Address  Cancer Risk Management Program  [facility]  [address]  [place] [address]    New patient appointments  [phone]  June 8, 2017    July Delaney  [address] [place]  [place] [address]      Dear July,  This is a summary of your genetic counseling visit to discuss your genetic test results.  Please let me know if you have any questions or if you have any information about genetic testing in your aunt.    6/8/2017    Referring Provider: Eveline Frye MD    Presenting Information:  July returned to the Cancer Risk Management Program at the [facility] to discuss her genetic testing results. Her blood was drawn on May 11, 2017.  The OvaNext panel testing  was ordered from [facility] [facility]. This testing was done because of her family history of breast, uterine cancer and prostate cancer.    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    July is negative for mutations in the  KILO, BARD1, BRCA1, BRCA2, BRIP1, CDH1, CHEK2, DICER1, EPCAM, MLH1, MRE11A, MSH2, MSH6, MUTYH, NBN, NF1, PALB2, PMS2, PTEN, RAD50, RAD51C, RAD51D,  STK11, and TP53  genes.    No mutations were found in any of the other 24 genes analyzed.  This test involved sequencing and deletion/duplication analysis of all genes with the exception of EPCAM (deletions/duplications only).    Testing did not detect an identifiable mutation associated with  Hereditary Breast and Ovarian Cancer syndrome (BRCA1, BRCA2), Calzada syndrome (MLH1, MSH2, MSH6, PMS2, EPCAM), Hereditary Diffuse Gastric Cancer (CDH1), Cowden syndrome  "(PTEN), Li Fraumeni syndrome (TP53), Peutz-Jeghers syndrome (STK11), MUTYH Associated Polyposis (MAP), or Neurofibromatosis type 1 (NF1). We reviewed the autosomal dominant inheritance of these genes.  July cannot pass on a mutation in any of these genes to her children based on this test result.  Mutations in these genes do not skip generations.      A copy of the test report can be found in the Laboratory tab, dated 5/11/17, and named \"SEND OUTS [facility] TEST\". The report is scanned in as a linked document.    Interpretation:  We discussed several different interpretations of this inconclusive test result.  It is not clear if this variant in the SMARCA4 gene is associated with increased cancer risk.  1. This variant may be a benign/non cancer causing change that does not increase cancer risk.  2. This variant may be a harmful mutation that is associated with cancer.    Individuals who have a harmful mutation in the SMARCA4 gene may have an increased risk of childhood cancers of the kidney and brain. Mutations in this gene are also associated with small cell ovarian cancer.  Some individuals who have a mutation in this gene do not develop cancer at all. The risk of cancer is unclear with a harmful/cancer causing  mutation.      The lab is working to determine if this variant is harmful or benign, and they will contact me if it is reclassified.  If this variant is determined to be a benign change, there may be a different gene or combination of genes and environment that are associated with the cancers in July and/or her relatives.      It is important to consider that July's mother and/or maternal aunt may have had a mutation in one of the genes tested in the OvaNext panel and July did not inherit it.  She will consider talking to her maternal aunt who was diagnosed with breast cancer at age 33 and is now 60 years old.    Inheritance:  We reviewed the autosomal dominant inheritance of this variant in " SMARCA4 gene.  We discussed that July has a 50% chance to pass this variant to each of her children.   Likewise, each of her siblings also has a 50% risk of having the same variant.  Because it is unclear what, if any, risk is associated with this variant, clinical genetic testing is not recommended for relatives.    Family Studies:  The laboratory may offer additional research based testing for specific relatives in order to help reclassify this variant.    Screening:  Based on this inconclusive test result, it is important for July and her relatives to refer back to the family history for appropriate cancer screening.      Based on her family history of two maternal aunts diagnosed with breast cancer under the age of 35, July does meet criteria for high risk breast cancer screening using the Jerman table. Her lifetime risk of breast cancer  is ~25% based on this table.     The high risk breast cancer screening involves an annual mammogram and annual breast MRI, alternating every 6 months.     I referred July to ASHER Theodore to discuss high risk screening.       Summary:  We do not have an explanation for July's family history of cancer.  Because of that, it is important that she continue with cancer screening based on her personal and family history as discussed above.    Genetic testing is rapidly advancing, and new cancer susceptibility genes will most likely be identified in the future.  Therefore, I encouraged July to contact me  if there are changes in her personal or family history.  This may change how we assess her cancer risk, screening, and the testing we would offer.    Plan:  1.  I provided July with a copy of her test results today.    2. She plans to follow-up with ASHER Theodore and Dr. Frye.  3. July is considering contacting her maternal aunt to determine if she had genetic testing for hereditary breast cancer.  4. She should contact me  if her family  history changes.  5. I will contact July if the lab informs me that this VUS has been reclassified.  This may change screening and testing recommendations for relatives.    If July has any further questions, I encouraged her to contact me at .    Time spent face to face: 15 minutes.    .    Sincerely,    Kristin Sharma MS [facility]  Genetic Counselor  [phone] (phone)    CC: Eveline Frye MD  [address] [place] [id number]

## 2017-06-08 NOTE — MR AVS SNAPSHOT
After Visit Summary   6/8/2017    July Delaney    MRN: [id number]           Patient Information     Date Of Birth          1985        Visit Information        Provider Department      6/8/2017 10:15 AM Kristin [facility]        Care Instructions      Genetic Testing  You had a blood test that looked at the genetic information in one or more genes associated with increased cancer risk.  The testing looked for any harmful changes that would stop this particular gene from working like it should.  It is important to remember that everyone has variants in multiple genes in their bodies that do not affect how the gene works.  These changes are benign and do not cause disease or increase cancer risk.  The term often used to describe these variants is benign polymorphism.  If an individual is found to have a benign polymorphism, their genetic test result is reported as Negative.    Results  There are three possible results of any genetic test:    Positive--a harmful mutation was identified    Negative--no mutation was identified    Variant of unknown significance--a variation in one of the genes was identified, but it is unclear how this impacts cancer risk in the family    Variant of Unknown Significance (VUS)  A VUS was identified in your blood sample.  Scientists currently do not know if this variant is harmful or if it is a benign polymorphism not associated with any increased risk of cancer.   There are several reasons for this lack of knowledge, but likely your VUS has either never been seen before or has only been seen in a small number of individuals.  Until your VUS is studied in more detail and/or seen in more families, scientists are not able to predict if it is a harmful mutation or a benign polymorphism.  Therefore, the cause of the cancer in you and your relatives is still unknown.          Reclassification  Genetic testing laboratories and researchers are  constantly working to determine the importance of variants of unknown significance.  Most laboratories will notify the genetic counselor when a patient s VUS has been reclassified as a harmful mutation or a benign polymorphism.      Some families may be candidates for participation in the laboratory s variant research programs to help determine the importance of their VUS.  Only the testing laboratory can decide who is eligible for participation.  Participating in these programs does not guarantee that families will learn the significance of their VUS immediately.  It could be months or years before a VUS is reclassified.       Screening Recommendations  Cancer screening recommendations for patients with a VUS should be based on their personal and family history rather than the VUS itself.  This may include increased cancer screening for certain individuals in the family.  Your genetic counselor and health care provider can help make appropriate recommendations for you and your relatives.      It is usually not recommended that other relatives have genetic testing for the VUS unless it is done as part of the laboratory s variant research program because an individual s test results should not influence their cancer screening until we determine the importance of the VUS.  Your genetic counselor can help you and your relatives understand the risks and benefits of all genetic testing and cancer screening options.    Please call us if you have any questions or concerns.     Cancer Risk Management Program [phone]-CANCER ([phone])  ? Kristin Sharma, MS, [facility]  [phone]  ? Belle Yeager, MS, [facility]  [phone]  ? Marian Lama, MS, [facility]  [phone]  ? Xiomara Johns, MS, [facility]  [phone]            Follow-ups after your visit        Your next 10 appointments already scheduled     Jun 26, 2017 11:30 AM CDT   Return Visit with Matilde Antunez PA-C   [facility] ([facility]    [address]  [place]  [facility] [address]   [phone]              Who to contact     If you have questions or need follow up information about today's clinic visit or your schedule please contact CANCER RISK MANAGEMENT PROGRAM directly at [phone].  Normal or non-critical lab and imaging results will be communicated to you by MyChart, letter or phone within 4 business days after the clinic has received the results. If you do not hear from us within 7 days, please contact the clinic through Digidentityhart or phone. If you have a critical or abnormal lab result, we will notify you by phone as soon as possible.  Submit refill requests through SABIA or call your pharmacy and they will forward the refill request to us. Please allow 3 business days for your refill to be completed.          Additional Information About Your Visit        Digidentityhart Information     SABIA gives you secure access to your electronic health record. If you see a primary care provider, you can also send messages to your care team and make appointments. If you have questions, please call your primary care clinic.  If you do not have a primary care provider, please call [phone] and they will assist you.        Care EveryWhere ID     This is your Care EveryWhere ID. This could be used by other organizations to access your [facility] medical records  [id number]         Blood Pressure from Last 3 Encounters:   05/09/17 110/75   05/04/17 120/65   04/28/17 115/70    Weight from Last 3 Encounters:   05/09/17 71.4 kg (157 lb 6.4 oz)   05/04/17 69.4 kg (153 lb)   04/28/17 69.4 kg (153 lb)              Today, you had the following     No orders found for display       Primary Care Provider Office Phone # Fax #    ASHER Hampton [phone]       [address] [address]        Thank you!     Thank you for choosing CANCER RISK MANAGEMENT PROGRAM  for your care. Our goal is always to provide you  with excellent care. Hearing back from our patients is one way we can continue to improve our services. Please take a few minutes to complete the written survey that you may receive in the mail after your visit with us. Thank you!             Your Updated Medication List - Protect others around you: Learn how to safely use, store and throw away your medicines at www.disposemymeds.org.          This list is accurate as of: 6/8/17 10:30 AM.  Always use your most recent med list.                   Brand Name Dispense Instructions for use    ALEVE PO      Take 220 mg by mouth daily       HYDROcodone-acetaminophen 5-325 MG per tablet    NORCO    45 tablet    Take 1-2 tablets by mouth every 6 hours as needed       IBUPROFEN PO      Take 400 mg by mouth every 6 hours       paragard intrauterine copper          spironolactone 100 MG tablet    ALDACTONE    30 tablet    Take 1 tablet (100 mg) by mouth daily Overdue for recheck Derm appt: [phone] to schedule.       sulfamethoxazole-trimethoprim 400-80 MG per tablet    BACTRIM/SEPTRA    10 tablet    Take 1 tablet by mouth 2 times daily       triamcinolone 0.1 % cream    KENALOG    454 g    Apply to AA BID x 2-3 weeks then PRN       TYLENOL PO      Take 500 mg by mouth every 4 hours as needed for mild pain or fever       VENTOLIN  (90 BASE) MCG/ACT Inhaler   Generic drug:  albuterol      Inhale 2 puffs into the lungs

## 2017-06-08 NOTE — PROGRESS NOTES
"6/8/2017    Referring Provider: Eveline Frye MD    Presenting Information:  July returned to the Cancer Risk Management Program at the [facility] to discuss her genetic testing results. Her blood was drawn on May 11, 2017.  The OvaNext panel testing  was ordered from [facility]. This testing was done because of her family history of breast, uterine cancer and prostate cancer.    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  This variant is called [id number].    July is negative for mutations in the  KILO, BARD1, BRCA1, BRCA2, BRIP1, CDH1, CHEK2, DICER1, EPCAM, MLH1, MRE11A, MSH2, MSH6, MUTYH, NBN, NF1, PALB2, PMS2, PTEN, RAD50, RAD51C, RAD51D,  STK11, and TP53  genes.    No mutations were found in any of the other 24 genes analyzed.  This test involved sequencing and deletion/duplication analysis of all genes with the exception of EPCAM (deletions/duplications only).    Testing did not detect an identifiable mutation associated with  Hereditary Breast and Ovarian Cancer syndrome (BRCA1, BRCA2), Calzada syndrome (MLH1, MSH2, MSH6, PMS2, EPCAM), Hereditary Diffuse Gastric Cancer (CDH1), Cowden syndrome (PTEN), Li Fraumeni syndrome (TP53), Peutz-Jeghers syndrome (STK11), MUTYH Associated Polyposis (MAP), or Neurofibromatosis type 1 (NF1). We reviewed the autosomal dominant inheritance of these genes.  July cannot pass on a mutation in any of these genes to her children based on this test result.  Mutations in these genes do not skip generations.      A copy of the test report can be found in the Laboratory tab, dated 5/11/17, and named \"SEND OUTS MISC TEST\". The report is scanned in as a linked document.    Interpretation:  We discussed several different interpretations of this inconclusive test result.  It is not clear if this variant in the SMARCA4 gene is associated with increased cancer " risk.  1. This variant may be a benign/non cancer causing change that does not increase cancer risk.  2. This variant may be a harmful mutation that is associated with cancer.    Individuals who have a harmful mutation in the SMARCA4 gene may have an increased risk of childhood cancers of the kidney and brain. Mutations in this gene are also associated with small cell ovarian cancer.  Some individuals who have a mutation in this gene do not develop cancer at all. The risk of cancer is unclear with a harmful/cancer causing  mutation.     The lab is working to determine if this variant is harmful or benign, and they will contact me if it is reclassified.  If this variant is determined to be a benign change, there may be a different gene or combination of genes and environment that are associated with the cancers in July and/or her relatives.      It is important to consider that July's mother and/or maternal aunt may have had a mutation in one of the genes tested in the OvaNext panel and July did not inherit it.  She will consider talking to her maternal aunt who was diagnosed with breast cancer at age 33 and is now 60 years old.    Inheritance:  We reviewed the autosomal dominant inheritance of this variant in SMARCA4 gene.  We discussed that July has a 50% chance to pass this variant to each of her children.   Likewise, each of her siblings also has a 50% risk of having the same variant.  Because it is unclear what, if any, risk is associated with this variant, clinical genetic testing is not recommended for relatives.    Family Studies:  The laboratory may offer additional research based testing for specific relatives in order to help reclassify this variant.    Screening:  Based on this inconclusive test result, it is important for July and her relatives to refer back to the family history for appropriate cancer screening.      Based on her family history of two maternal aunts diagnosed with breast  cancer under the age of 35, July does meet criteria for high risk breast cancer screening using the Jerman table. Her lifetime risk of breast cancer  is ~25% based on this table.     The high risk breast cancer screening involves an annual mammogram and annual breast MRI, alternating every 6 months.     I referred July to ASHER Theodore to discuss high risk screening.       Summary:  We do not have an explanation for July's family history of cancer.  Because of that, it is important that she continue with cancer screening based on her personal and family history as discussed above.    Genetic testing is rapidly advancing, and new cancer susceptibility genes will most likely be identified in the future.  Therefore, I encouraged July to contact me  if there are changes in her personal or family history.  This may change how we assess her cancer risk, screening, and the testing we would offer.    Plan:  1.  I provided July with a copy of her test results today.    2. She plans to follow-up with ASHER Theodore and Dr. Frye.  3. July is considering contacting her maternal aunt to determine if she had genetic testing for hereditary breast cancer.  4. She should contact me  if her family history changes.  5. I will contact July if the lab informs me that this VUS has been reclassified.  This may change screening and testing recommendations for relatives.    If July has any further questions, I encouraged her to contact me at .      Time spent face to face: 15 minutes.    Kristin Sharma MS [facility]  Genetic Counselor  [phone]

## 2017-06-08 NOTE — PATIENT INSTRUCTIONS
Genetic Testing  You had a blood test that looked at the genetic information in one or more genes associated with increased cancer risk.  The testing looked for any harmful changes that would stop this particular gene from working like it should.  It is important to remember that everyone has variants in multiple genes in their bodies that do not affect how the gene works.  These changes are benign and do not cause disease or increase cancer risk.  The term often used to describe these variants is benign polymorphism.  If an individual is found to have a benign polymorphism, their genetic test result is reported as Negative.    Results  There are three possible results of any genetic test:    Positive--a harmful mutation was identified    Negative--no mutation was identified    Variant of unknown significance--a variation in one of the genes was identified, but it is unclear how this impacts cancer risk in the family    Variant of Unknown Significance (VUS)  A VUS was identified in your blood sample.  Scientists currently do not know if this variant is harmful or if it is a benign polymorphism not associated with any increased risk of cancer.   There are several reasons for this lack of knowledge, but likely your VUS has either never been seen before or has only been seen in a small number of individuals.  Until your VUS is studied in more detail and/or seen in more families, scientists are not able to predict if it is a harmful mutation or a benign polymorphism.  Therefore, the cause of the cancer in you and your relatives is still unknown.          Reclassification  Genetic testing laboratories and researchers are constantly working to determine the importance of variants of unknown significance.  Most laboratories will notify the genetic counselor when a patient s VUS has been reclassified as a harmful mutation or a benign polymorphism.      Some families may be candidates for participation in the laboratory s  variant research programs to help determine the importance of their VUS.  Only the testing laboratory can decide who is eligible for participation.  Participating in these programs does not guarantee that families will learn the significance of their VUS immediately.  It could be months or years before a VUS is reclassified.       Screening Recommendations  Cancer screening recommendations for patients with a VUS should be based on their personal and family history rather than the VUS itself.  This may include increased cancer screening for certain individuals in the family.  Your genetic counselor and health care provider can help make appropriate recommendations for you and your relatives.      It is usually not recommended that other relatives have genetic testing for the VUS unless it is done as part of the laboratory s variant research program because an individual s test results should not influence their cancer screening until we determine the importance of the VUS.  Your genetic counselor can help you and your relatives understand the risks and benefits of all genetic testing and cancer screening options.    Please call us if you have any questions or concerns.     Cancer Risk Management Program [phone]-CANCER ([phone])  ? Kristin Sharma, MS, [facility]  [phone]  ? Belle Yeager, MS, [facility]  [phone]  ? Marian Lama, MS, [facility]  [phone]  ? Xiomara Johns, MS, [facility]  [phone]

## 2017-06-11 LAB — LAB SCANNED RESULT: NORMAL

## 2017-07-11 ENCOUNTER — TELEPHONE (OUTPATIENT)
Dept: ONCOLOGY | Facility: CLINIC | Age: 32
End: 2017-07-11

## 2017-07-11 NOTE — TELEPHONE ENCOUNTER
2017  July asked that I call her via a Eyes[employer] message.  She had additional family history information after talking to her maternal aunt.    Additional family history information:     1. Her two maternal aunts had both breast cancer and ovarian cancer at young ages, not just breast cancer as previously reported.  One of her maternal aunts is .  2. Her maternal aunt who she spoke to about the family history also reported that she had genetic testing for hereditary breast cancer in the early  and it was negative. This maternal aunt had both breast and ovarian cancer at a young age. See pedigree.  3. Another maternal aunt who did not have cancer has had genetic testing with the last couple of years and it was negative.    Discussion:    I told Shani that was helpful information and it was very important that her maternal aunt who had cancer, have additional testing. Since , the BRCA1 and BRCA2 genes have additional testing techniques available for evaluation and more genes are available to test that are associated with hereditary breast and ovarian cancer.    We also discussed that it is very important that someone who has cancer be the person tested in the family. In her case, that is her maternal aunt.    I encouraged July to talk to her aunt again and I gave her my contact information in case her aunt wanted more information about recent testing options.    July will call me with any updates.    Kristin Sharma MS [facility]  Genetic Counselor  [phone]

## 2017-07-13 ENCOUNTER — OFFICE VISIT (OUTPATIENT)
Dept: DERMATOLOGY | Facility: CLINIC | Age: 32
End: 2017-07-13
Payer: COMMERCIAL

## 2017-07-13 VITALS — DIASTOLIC BLOOD PRESSURE: 79 MMHG | SYSTOLIC BLOOD PRESSURE: 120 MMHG | HEART RATE: 66 BPM | OXYGEN SATURATION: 100 %

## 2017-07-13 DIAGNOSIS — L70.0 ACNE CYSTICA: ICD-10-CM

## 2017-07-13 DIAGNOSIS — L70.0 ACNE VULGARIS: Primary | ICD-10-CM

## 2017-07-13 PROCEDURE — 99213 OFFICE O/P EST LOW 20 MIN: CPT | Performed by: PHYSICIAN ASSISTANT

## 2017-07-13 RX ORDER — MINOCYCLINE HYDROCHLORIDE 100 MG/1
100 CAPSULE ORAL 2 TIMES DAILY
Qty: 180 CAPSULE | Refills: 0 | Status: SHIPPED | OUTPATIENT
Start: 2017-07-13

## 2017-07-13 RX ORDER — SPIRONOLACTONE 50 MG/1
50 TABLET, FILM COATED ORAL DAILY
Qty: 90 TABLET | Refills: 3 | Status: SHIPPED | OUTPATIENT
Start: 2017-07-13 | End: 2018-01-25

## 2017-07-13 RX ORDER — SPIRONOLACTONE 100 MG/1
100 TABLET, FILM COATED ORAL DAILY
Qty: 90 TABLET | Refills: 1 | Status: SHIPPED | OUTPATIENT
Start: 2017-07-13 | End: 2018-01-25

## 2017-07-13 NOTE — NURSING NOTE
"Initial /79  Pulse 66  SpO2 100% Estimated body mass index is 24.29 kg/(m^2) as calculated from the following:    Height as of 5/9/17: 1.715 m (5' 7.5\").    Weight as of 5/9/17: 71.4 kg (157 lb 6.4 oz). .    Saray Santo LPN    "

## 2017-07-13 NOTE — PROGRESS NOTES
HPI:   July Delaney is a 31 year old female who presents for evaluation of itchy skin  chief complaint  Location: arms, legs - starting on abdomen   Condition present for:  About 2 months.   Previous treatments include: PO prednisone   -has not changed any personal care products; not getting hives    Review Of Systems  Eyes: negative  Ears/Nose/Throat: negative  Respiratory: No shortness of breath, dyspnea on exertion, cough, or hemoptysis  Cardiovascular: negative  Gastrointestinal: negative  Genitourinary: negative  Musculoskeletal: negative  Neurologic: negative  Psychiatric: negative        PHYSICAL EXAM:      Skin exam performed as follows: Type 2 skin. Mood appropriate  Alert and Oriented X 3. Well developed, well nourished in no distress.  General appearance: Normal  Head including face: Normal  Eyes: conjunctiva and lids: Normal  Mouth: Lips, teeth, gums: Normal  Neck: Normal  Chest-breast/axillae: Normal  Back: Normal  Spleen and liver: Normal  Cardiovascular: Exam of peripheral vascular system by observation for swelling, varicosities, edema: Normal  Genitalia: groin, buttocks: Normal  Extremities: digits/nails (clubbing): Normal  Eccrine and Apocrine glands: Normal  Right upper extremity: Normal  Left upper extremity: Normal  Right lower extremity: Normal  Left lower extremity: Normal  Skin: Scalp and body hair: See below    1. Comedones and cysts on face    ASSESSMENT/PLAN:     1.  Acne Vulgaris - was improved on spironolactone 100 mg but still had some breakouts. Discussed increasing the dose and she is amenable to trying this. Also didn't notice much benefit from the doxycycline. advised on diagnosis and treatment options. Discussed use of topical medications and antibiotics. Tends to get deep cystic pimples   --Increase to spironolactone 150 mg daily. Advised on potential for hypotension, teratogenetic effects, menstrual irregularities, hyperkalemia and need for Q 6 month K+ checks.   --Start   mg BID x 3 months; advised to d/c if HA/dizziness. Advised on GI upset, photosensitivity potential for pigmentation changes. Can switch to azithromycin if struggling; Bactrim gives her stomach upset.             Follow-up: 6 months/PRN sooner  CC:   Scribed By: Matilde Antunez MS, PA-C

## 2017-08-08 PROBLEM — L70.0 ACNE VULGARIS: Status: ACTIVE | Noted: 2017-01-18

## 2017-08-08 PROBLEM — L20.89 FLEXURAL ATOPIC DERMATITIS: Status: ACTIVE | Noted: 2017-01-18

## 2017-08-08 RX ORDER — TRAZODONE HYDROCHLORIDE 50 MG/1
50 TABLET, FILM COATED ORAL
COMMUNITY
Start: 2017-05-30 | End: 2017-08-09

## 2017-08-08 RX ORDER — DIAZEPAM 5 MG
5 TABLET ORAL
COMMUNITY
Start: 2017-05-30 | End: 2017-08-09

## 2017-08-09 ENCOUNTER — ONCOLOGY VISIT (OUTPATIENT)
Dept: ONCOLOGY | Facility: CLINIC | Age: 32
End: 2017-08-09
Payer: COMMERCIAL

## 2017-08-09 VITALS
OXYGEN SATURATION: 98 % | HEART RATE: 71 BPM | SYSTOLIC BLOOD PRESSURE: 125 MMHG | TEMPERATURE: 98.2 F | DIASTOLIC BLOOD PRESSURE: 84 MMHG

## 2017-08-09 DIAGNOSIS — Z80.3 FAMILY HISTORY OF MALIGNANT NEOPLASM OF BREAST: Primary | ICD-10-CM

## 2017-08-09 DIAGNOSIS — Z12.39 ENCOUNTER FOR BREAST CANCER SCREENING OTHER THAN MAMMOGRAM: ICD-10-CM

## 2017-08-09 PROCEDURE — 99214 OFFICE O/P EST MOD 30 MIN: CPT | Performed by: CLINICAL NURSE SPECIALIST

## 2017-08-09 ASSESSMENT — PAIN SCALES - GENERAL: PAINLEVEL: NO PAIN (0)

## 2017-08-09 NOTE — LETTER
Cancer Risk Management  Program Locations    [facility]  [facility]  [facility]  [facility]  Mailing Address  Cancer Risk Management Program  [facility]  [address]  [place] [address]    New patient appointments  [phone]  2017    July Delaney  [address] [place]  [place] [address]      Dear July,    It was a pleasure meeting with you today.  Below is a copy of my note from our visit, outlining your surveillance plan.      I look forward to seeing you in the future to coordinate your care and reduce your health risks. Please feel free to contact me if you have any questions or concerns.      Oncology Risk Management Consultation:  Date on this visit: 2017    July Delaney  is referred by Kristin Sharma, Certified Genetic Counselor,  for an oncology risk management consultation. She requires evaluation for her risk of cancer secondary to having a family history of breast cancer in two maternal aunts, at ages 29 and 33.. She is considered to at high risk for breast cancer and has a 25% lifetime risk for breast cancer by the Jerman model.     Primary Physician: ASHER Hampton-CNP    History Of Present Illness:  Ms. Delaney is a very pleasant, healthy 32 year old female who presents with family history of breast cancer.    Pertinent history:  Menarche at 16.  First child at 19.  Ovaries, fallopian tubes and uterus intact.  Hx of breastfeeding nearly 2 years total.  Hx of OCPs about 4 years.  No history of dysplasia, hyperplasia or malignancy.     Genetic testin2017 - NEGATIVE for 25 genes associated with hereditary breast and gynecological cancers using an Ova Next panel through [employer]. July tested negative for genetic mutations in KILO, BARD1, BRCA1, BRCA2, BRIP1, CDH1, CHEK2, DICER1,  EPCAM, MLH1, MRE11A, MSH2, MSH6, MUTYH, NBN, NF1, PALB2, PMS2, PTEN, RAD50, RAD51C, RAD51D, SMARCA4, STK11 and TP53 genes.      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 The VUS is considered unactionable at this time, as it is not known whether it would produce a pathological change which could predispose her to ovarian cancer.    Pertinent screening history:  4/18/2017 - Baseline screening mammogram, BiRads1. Heterogeneously dense breasts noted.     At this visit,  she reports feeling well and denies breast pain, nipple discharge, changes in symmetry, shape and skin of her breasts.      Past Medical/Surgical History:  No past medical history on file.  Past Surgical History:   Procedure Laterality Date     CHOLECYSTECTOMY       LAPAROSCOPIC CHOLECYSTECTOMY N/A 5/4/2017    Procedure: LAPAROSCOPIC CHOLECYSTECTOMY;  Laparoscopic Cholecystectomy;  Surgeon: Modesto Tamez MD;  Location: [facility] OR       Allergies:  Allergies as of 08/09/2017 - [place] as Reviewed 07/13/2017   Allergen Reaction Noted     Amoxicillin Itching 11/03/2016     Apap-pyrilamine Hives 11/03/2016     Ceftriaxone Hives 11/03/2016     Cephalexin Itching 11/03/2016     Ciprofloxacin Itching 11/03/2016       Current Medications:  Current Outpatient Prescriptions   Medication Sig Dispense Refill     traZODone (DESYREL) 50 MG tablet Take 50 mg by mouth       diazepam (VALIUM) 5 MG tablet Take 5 mg by mouth       spironolactone (ALDACTONE) 100 MG tablet Take 1 tablet (100 mg) by mouth daily 90 tablet 1     spironolactone (ALDACTONE) 50 MG tablet Take 1 tablet (50 mg) by mouth daily 90 tablet 3     minocycline (MINOCIN/DYNACIN) 100 MG capsule Take 1 capsule (100 mg) by mouth 2 times daily 180 capsule 0     IBUPROFEN PO Take 400 mg by mouth every 6 hours       Naproxen Sodium (ALEVE PO) Take 220 mg by mouth daily       Acetaminophen (TYLENOL PO) Take 500 mg by mouth every 4 hours as needed for mild pain or fever        HYDROcodone-acetaminophen (NORCO) 5-325 MG per tablet Take 1-2 tablets by mouth every 6 hours as needed 45 tablet 0     paragard intrauterine copper        albuterol (VENTOLIN HFA) 108 (90 BASE) MCG/ACT inhaler Inhale 2 puffs into the lungs       triamcinolone (KENALOG) 0.1 % cream Apply to AA BID x 2-3 weeks then  g 1        Family History:  Family History   Problem Relation Age of Onset     Uterine Cancer Maternal Grandmother 50     Breast Cancer Maternal Aunt 29      at 33     Ovarian Cancer Maternal Aunt      Breast Cancer Maternal Aunt 33     unilateral radical mastectomy     Ovarian Cancer Maternal Aunt      Prostate Cancer Paternal Grandfather 70       Social History:  Social History     Social History     Marital status: Single     Spouse name: N/A     Number of children: N/A     Years of education: N/A     Occupational History     Not on file.     Social History Main Topics     Smoking status: Former Smoker     Smokeless tobacco: Not on file     Alcohol use Yes     Drug use: No     Sexual activity: Yes     Birth control/ protection: Implant     Other Topics Concern     Not on file     Social History Narrative       Review of Systems:  GENERAL: No change in weight, sleep or appetite.  Normal energy.  No fever or chills  EYES: Negative for vision changes or eye problems  ENT: No problems with ears, nose or throat.  No difficulty swallowing.  RESP: No coughing, wheezing or shortness of breath  CV: No chest pains or palpitations  GI: No nausea, vomiting,  heartburn, abdominal pain, diarrhea, constipation or change in bowel habits  : No urinary frequency or dysuria, bladder or kidney problems  MUSCULOSKELETAL: No significant muscle or joint pains  NEUROLOGIC: No headaches, numbness, tingling, weakness, problems with balance or coordination  PSYCHIATRIC: No problems with anxiety, depression or mental health  HEME/IMMUNE/ALLERGY: No history of bleeding or clotting problems or anemia.  No allergies or  immune system problems  ENDOCRINE: No history of thyroid disease, diabetes or other endocrine disorders  SKIN: No rashes,worrisome lesions or skin problems    Physical Exam:  There were no vitals taken for this visit.     Physical exam deferred.       Laboratory/Imaging Studies  Results for orders placed or performed in visit on 05/11/17   [employer] OvaNext Panel NGS: Laboratory Miscellaneous Order   Result Value Ref Range    Miscellaneous Test       Specimen Received, Reordered and sent to Performing laboratory - Report to   follow upon completion.     Send outs misc test   Result Value Ref Range    Lab Scanned Result SEND OUTS MISC TEST-Scanned        ASSESSMENT  We discussed her screening history, breast health, prevention and surveillance options as well as the signs and symptoms to be watchful for in between surveillance visits. Because of the young ages of breast cancer in her family, we would recommend that she begin a high risk screening protocol at this time. She verbalized understanding of signs and symptoms to address with her physicians lumps, thickening, swelling, tenderness, nipple discharge or changes in skin of breasts.    We also discussed following  a healthy lifestyle plan recommended by both NCCN and the American Cancer Society that can reduce the risk of breast cancer. She agreed to the following recommendations:  1 Limit alcohol consumption to less than 1 drink per day (1 drink=5 oz.wine, 12 oz. Beer or 1.5 oz. 80-proof liquor).  2. Exercise per American Cancer Society guidelines of at least 150 minutes of moderate-intensity activity or 75 minutes of vigorous activity each week. (Or a combination of both.) Exercise should be spread  out over the week.  3. Maintain a healthy weight with a Body Mass Index between 19-24.9.  4. Do not use tobacco products and limit exposure to passive smoke.  5. Breastfeed if possible. Research shows that 16+ months of breastfeeding, over a lifetime, can  reduce a woman's risk of breast cancer by up to 25%.    INDIVIDUALIZED CANCER RISK MANAGEMENT PLAN:  Individualized Surveillance Plan for women  With 20% or greater lifetime risk of breast cancer   Per NCCN Guidelines Version 2.2016   Recommended screening Test or procedure Last done Next Scheduled    Clinical encounter Ongoing risk assessment, risk reduction counseling and clinical breast exam, every 6-12 months.   8/9/2017 April 2018   However, some family histories with breast cancers at a very young age, may warrant screening starting earlier.    *May begin at age 40 if breast cancers in the family occur at later ages.    Annual mammogram beginning 10 years younger than the earliest breast cancer in the family but not prior to age 30.    Recommend annual breast MRI to begin 10 years younger than the earliest breast cancer in the family but not prior to age 25.    Breast MRIs are preferably done on day 7-15 of the menstrual cycle in premenopausal women. 4/18/2017 - Baseline mammogram, BiRads1     No breast MRI to date. NEXT: Breast MRI in October.    Next exam: April 2018, followed by tomosynthesis mammogram   Breast screening for patients at high risk due to thoracic radiation between the ages of 10-30     Begin 8-10 years post radiation treatment or age 25.   Annual mammogram   and  Annual breast MRI, preferably on day 7-15 of menstrual cycle for premenopausal women.    Annual clinical breast exams with ongoing risk assessment and risk reduction counseling until age 25, then every 6-12 months.     NA     NA     I will be happy to work with her to manage her cancer risk, will follow up on her screenings and see her in the Cancer Risk Management clinic in six months.    I spent 35 minutes with the patient with greater that 50% of it in counseling and coordinating care as documented above.    Dee Dee Russell, APRN-CNS, OCN, ANG-BC  Clinical Nurse Specialist  Cancer Risk Management Program  [facility]  [address]  [place] [address]    phone:  [phone]  Pager: [phone]  fax: [phone]    Cc: ASHER Hampton-CNP

## 2017-08-09 NOTE — PROGRESS NOTES
Oncology Risk Management Consultation:  Date on this visit: 2017    July Delaney  is referred by Kristin Sharma, Certified Genetic Counselor,  for an oncology risk management consultation. She requires evaluation for her risk of cancer secondary to having a family history of breast cancer in two maternal aunts, at ages 29 and 33.. She is considered to at high risk for breast cancer and has a 25% lifetime risk for breast cancer by the Jerman model.     Primary Physician: ASHER Hampton-CNP    History Of Present Illness:  Ms. Delaney is a very pleasant, healthy 32 year old female who presents with family history of breast cancer.    Pertinent history:  Menarche at 16.  First child at 19.  Ovaries, fallopian tubes and uterus intact.  Hx of breastfeeding nearly 2 years total.  Hx of OCPs about 4 years.  No history of dysplasia, hyperplasia or malignancy.     Genetic testin2017 - NEGATIVE for 25 genes associated with hereditary breast and gynecological cancers using an [employer] Next panel through [employer]. July tested negative for genetic mutations in KILO, BARD1, BRCA1, BRCA2, BRIP1, CDH1, CHEK2, DICER1, EPCAM, MLH1, MRE11A, MSH2, MSH6, MUTYH, NBN, NF1, PALB2, PMS2, PTEN, RAD50, RAD51C, RAD51D, SMARCA4, STK11 and TP53 genes.      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 The VUS is considered unactionable at this time, as it is not known whether it would produce a pathological change which could predispose her to ovarian cancer.    Pertinent screening history:  2017 - Baseline screening mammogram, BiRads1. Heterogeneously dense breasts noted.     At this visit,  she reports feeling well and denies breast pain, nipple discharge, changes in symmetry, shape and skin of her breasts.      Past Medical/Surgical History:  No past medical history on file.  Past Surgical History:   Procedure Laterality Date     CHOLECYSTECTOMY       LAPAROSCOPIC  CHOLECYSTECTOMY N/A 2017    Procedure: LAPAROSCOPIC CHOLECYSTECTOMY;  Laparoscopic Cholecystectomy;  Surgeon: Modesto Tamez MD;  Location: [facility] OR       Allergies:  Allergies as of 2017 - [place] as Reviewed 2017   Allergen Reaction Noted     Amoxicillin Itching 2016     Apap-pyrilamine Hives 2016     Ceftriaxone Hives 2016     Cephalexin Itching 2016     Ciprofloxacin Itching 2016       Current Medications:  Current Outpatient Prescriptions   Medication Sig Dispense Refill     traZODone (DESYREL) 50 MG tablet Take 50 mg by mouth       diazepam (VALIUM) 5 MG tablet Take 5 mg by mouth       spironolactone (ALDACTONE) 100 MG tablet Take 1 tablet (100 mg) by mouth daily 90 tablet 1     spironolactone (ALDACTONE) 50 MG tablet Take 1 tablet (50 mg) by mouth daily 90 tablet 3     minocycline (MINOCIN/DYNACIN) 100 MG capsule Take 1 capsule (100 mg) by mouth 2 times daily 180 capsule 0     IBUPROFEN PO Take 400 mg by mouth every 6 hours       Naproxen Sodium (ALEVE PO) Take 220 mg by mouth daily       Acetaminophen (TYLENOL PO) Take 500 mg by mouth every 4 hours as needed for mild pain or fever       HYDROcodone-acetaminophen (NORCO) 5-325 MG per tablet Take 1-2 tablets by mouth every 6 hours as needed 45 tablet 0     paragard intrauterine copper        albuterol (VENTOLIN HFA) 108 (90 BASE) MCG/ACT inhaler Inhale 2 puffs into the lungs       triamcinolone (KENALOG) 0.1 % cream Apply to AA BID x 2-3 weeks then  g 1        Family History:  Family History   Problem Relation Age of Onset     Uterine Cancer Maternal Grandmother 50     Breast Cancer Maternal Aunt 29      at 33     Ovarian Cancer Maternal Aunt      Breast Cancer Maternal Aunt 33     unilateral radical mastectomy     Ovarian Cancer Maternal Aunt      Prostate Cancer Paternal Grandfather 70       Social History:  Social History     Social History     Marital status: Single     Spouse name: N/A     Number of  children: N/A     Years of education: N/A     Occupational History     Not on file.     Social History Main Topics     Smoking status: Former Smoker     Smokeless tobacco: Not on file     Alcohol use Yes     Drug use: No     Sexual activity: Yes     Birth control/ protection: Implant     Other Topics Concern     Not on file     Social History Narrative       Review of Systems:  GENERAL: No change in weight, sleep or appetite.  Normal energy.  No fever or chills  EYES: Negative for vision changes or eye problems  ENT: No problems with ears, nose or throat.  No difficulty swallowing.  RESP: No coughing, wheezing or shortness of breath  CV: No chest pains or palpitations  GI: No nausea, vomiting,  heartburn, abdominal pain, diarrhea, constipation or change in bowel habits  : No urinary frequency or dysuria, bladder or kidney problems  MUSCULOSKELETAL: No significant muscle or joint pains  NEUROLOGIC: No headaches, numbness, tingling, weakness, problems with balance or coordination  PSYCHIATRIC: No problems with anxiety, depression or mental health  HEME/IMMUNE/ALLERGY: No history of bleeding or clotting problems or anemia.  No allergies or immune system problems  ENDOCRINE: No history of thyroid disease, diabetes or other endocrine disorders  SKIN: No rashes,worrisome lesions or skin problems    Physical Exam:  There were no vitals taken for this visit.     Physical exam deferred.       Laboratory/Imaging Studies  Results for orders placed or performed in visit on 05/11/17   [employer] OvaNext Panel NGS: Laboratory Miscellaneous Order   Result Value Ref Range    Miscellaneous Test       Specimen Received, Reordered and sent to Performing laboratory - Report to   follow upon completion.     Send outs misc test   Result Value Ref Range    Lab Scanned Result SEND OUTS MISC TEST-Scanned        ASSESSMENT  We discussed her screening history, breast health, prevention and surveillance options as well as the signs and  symptoms to be watchful for in between surveillance visits. Because of the young ages of breast cancer in her family, we would recommend that she begin a high risk screening protocol at this time. She verbalized understanding of signs and symptoms to address with her physicians lumps, thickening, swelling, tenderness, nipple discharge or changes in skin of breasts.    We also discussed following  a healthy lifestyle plan recommended by both NCCN and the American Cancer Society that can reduce the risk of breast cancer. She agreed to the following recommendations:  1 Limit alcohol consumption to less than 1 drink per day (1 drink=5 oz.wine, 12 oz. Beer or 1.5 oz. 80-proof liquor).  2. Exercise per American Cancer Society guidelines of at least 150 minutes of moderate-intensity activity or 75 minutes of vigorous activity each week. (Or a combination of both.) Exercise should be spread  out over the week.  3. Maintain a healthy weight with a Body Mass Index between 19-24.9.  4. Do not use tobacco products and limit exposure to passive smoke.  5. Breastfeed if possible. Research shows that 16+ months of breastfeeding, over a lifetime, can reduce a woman's risk of breast cancer by up to 25%.    INDIVIDUALIZED CANCER RISK MANAGEMENT PLAN:  Individualized Surveillance Plan for women  With 20% or greater lifetime risk of breast cancer   Per NCCN Guidelines Version 2.2016   Recommended screening Test or procedure Last done Next Scheduled    Clinical encounter Ongoing risk assessment, risk reduction counseling and clinical breast exam, every 6-12 months.   8/9/2017 April 2018   However, some family histories with breast cancers at a very young age, may warrant screening starting earlier.    *May begin at age 40 if breast cancers in the family occur at later ages.    Annual mammogram beginning 10 years younger than the earliest breast cancer in the family but not prior to age 30.    Recommend annual breast MRI to begin 10  years younger than the earliest breast cancer in the family but not prior to age 25.    Breast MRIs are preferably done on day 7-15 of the menstrual cycle in premenopausal women. 4/18/2017 - Baseline mammogram, BiRads1     No breast MRI to date. NEXT: Breast MRI in October.    Next exam: April 2018, followed by tomosynthesis mammogram   Breast screening for patients at high risk due to thoracic radiation between the ages of 10-30     Begin 8-10 years post radiation treatment or age 25.   Annual mammogram   and  Annual breast MRI, preferably on day 7-15 of menstrual cycle for premenopausal women.    Annual clinical breast exams with ongoing risk assessment and risk reduction counseling until age 25, then every 6-12 months.     NA     NA     I will be happy to work with her to manage her cancer risk, will follow up on her screenings and see her in the Cancer Risk Management clinic in six months.    I spent 35 minutes with the patient with greater that 50% of it in counseling and coordinating care as documented above.    ASHER Moon-CNS, OCN, ANG-BC  Clinical Nurse Specialist  Cancer Risk Management Program  [address]  [place] [address]    phone:  [phone]  Pager: [phone]  fax: [phone]    Cc: RADHA HamptonCNP

## 2017-08-09 NOTE — MR AVS SNAPSHOT
After Visit Summary   8/9/2017    July Delaney    MRN: [id number]           Patient Information     Date Of Birth          1985        Visit Information        Provider Department      8/9/2017 1:00 PM Dee Dee Russell APRN [facility]        Today's Diagnoses     Family history of malignant neoplasm of breast    -  1    Encounter for breast cancer screening other than mammogram          Care Instructions    Individualized Surveillance Plan for women  With 20% or greater lifetime risk of breast cancer   Per NCCN Guidelines Version 2.2016   Recommended screening Test or procedure Last done Next Scheduled    Clinical encounter Ongoing risk assessment, risk reduction counseling and clinical breast exam, every 6-12 months.   8/9/2017 April 2018   However, some family histories with breast cancers at a very young age, may warrant screening starting earlier.    *May begin at age 40 if breast cancers in the family occur at later ages.    Annual mammogram beginning 10 years younger than the earliest breast cancer in the family but not prior to age 30.    Recommend annual breast MRI to begin 10 years younger than the earliest breast cancer in the family but not prior to age 25.    Breast MRIs are preferably done on day 7-15 of the menstrual cycle in premenopausal women. 4/18/2017 - Baseline mammogram, BiRads1     No breast MRI to date. NEXT: Breast MRI in October.    Next exam: April 2018, followed by tomosynthesis mammogram   Breast screening for patients at high risk due to thoracic radiation between the ages of 10-30     Begin 8-10 years post radiation treatment or age 25.   Annual mammogram   and  Annual breast MRI, preferably on day 7-15 of menstrual cycle for premenopausal women.    Annual clinical breast exams with ongoing risk assessment and risk reduction counseling until age 25, then every 6-12 months.     NA     NA       Magnetic Resonance Imaging (MRI) of the  Breast  Magnetic resonance imaging (MRI) of the breast is an imaging test that uses strong magnets and radio waves to form pictures of the inside of the breast. It also creates images of the tissues that surround the breast. Breast MRI is used to check for problems, such as a leaking breast implant or a suspicious lump or mass. It can also be used to help determine if breast cancer is present and aid in diagnosis and management. Most MRI tests take 30 to 60 minutes. Depending on the type of MRI you are having, the test may take longer. Give yourself extra time to check in.      During a breast MRI, you lie face down on a platform that slides into a tubelike machine called a scanner.   Before your test    Follow any directions you are given for taking medicines and for not eating or drinking before the test.    Follow your normal daily routine unless your provider tells you otherwise.    You ll be asked to remove your watch, hearing aids, credit cards, pens, eyeglasses, and other metal objects.    You may be asked to remove your makeup. Makeup may contain some metal.  MRI uses strong magnets. Metal is affected by magnets and can distort the image. The magnet used in MRI can cause metal objects in your body to move. If you have a metal implant, you may not be able to have an MRI unless the implant is certified as MRI safe. People with these implants should not have an MRI:    Ear (cochlear) implants    Certain clips used for brain aneurysms    Certain metal coils put in blood vessels    Most defibrillators    Most pacemakers  The radiologist or technologist may ask you if you:    Have had stereotactic breast biopsy    Have a pacemaker or implanted cardiac defibrillator    Have an artificial body part (prosthesis)    Have metal rods, screws, plates, or splinters in your body    Wear a medicated adhesive patch    Have tattoos or body piercings. Some tattoo inks contain metal.    Have implanted nerve stimulators or  drug-infusion ports    Work with metal    Have braces    Have a bullet or other metal in your body  Tell the radiologist or technologist if you:    Are allergic to any medicines    Are pregnant or think you may be pregnant    Are afraid of small, enclosed spaces (claustrophobic)    Have any serious health problems (If you have kidney disease or a liver transplant  you may not be able to have the contrast material used for MRI)    Have had previous surgeries    Are breastfeeding   During your test    You may be asked to wear a hospital gown.    You may be given earplugs to wear if you desire.    You may be injected with contrast through an intravenous (IV) line in your hand or arm. This is a special dye that makes the MRI image sharp that may be needed depending on the reason for your exam.    You ll lie on a platform that slides into a tube-like machine called a scanner. You ll be on your stomach with your breasts placed through openings in the platform.    Remain as still as you can while the camera takes the pictures. This will ensure the best images.  After your test    You can get back to normal activities right away.    If you were given contrast, it will pass naturally through your body within a day.    Drink lots of water so that the dye passes quickly out of your body.  Getting your results  Your healthcare provider will discuss the test results with you during a follow-up appointment or over the phone.  Date Last Reviewed: 2/1/2017 2000-2017 [employer]. [address] [place] [address]. All rights reserved. This information is not intended as a substitute for professional medical care. Always follow your healthcare professional's instructions.                Follow-ups after your visit        Follow-up notes from your care team     Return for Physical Exam.      Your next 10 appointments already scheduled     Oct 25, 2017 11:00 AM CDT   [facility] Dee Dee Russell  APRN CNS   [facility]    [address] [facility] [address]   [phone]            Oct 25, 2017 11:45 AM CDT   MR BREAST BILATERAL W/O & W CONTRAST with MGMR[facility]   [facility]    [address] [facility] [address]   [phone]           Take your medicines as usual, unless your doctor tells you not to. Bring a list of your current medicines to your exam (including vitamins, minerals and over-the-counter drugs).  The timing of your exam may depend on the start of your last period. If you re in menopause, you may have the exam anytime.  Please bring any previous mammograms or breast MRIs from other facilities to the MRI dept. Do not mail these items to us.  You will have contrast for this exam. To prepare:   The day before your exam, drink extra fluids at least six 8-ounce glasses (unless your doctor tells you to restrict your fluids).   Have a blood test (creatinine test) within 30 days of your exam. Go to your clinic or Diagnostic Imaging Department for this test.  The MRI machine uses a strong magnet. Please wear clothes without metal (snaps, zippers). A sweatsuit works well, or we may give you a hospital gown.  Please remove any body piercings and hair extensions before you arrive. You will also remove watches, jewelry, hairpins, wallets, dentures, partial dental plates and hearing aids. You may wear contact lenses, and you may be able to wear your rings. We have a safe place to keep your personal items, but it is safer to leave them at home.   **IMPORTANT** THE INSTRUCTIONS BELOW ARE ONLY FOR THOSE PATIENTS WHO HAVE BEEN TOLD THEY WILL RECEIVE SEDATION OR GENERAL ANESTHESIA DURING THEIR MRI PROCEDURE:  IF YOU WILL RECEIVE SEDATION (take medicine to help you relax during your exam)   You must get the medicine from your doctor before you arrive. Bring the medicine to the exam. Do not take it at  home.   Arrive one hour early. Bring someone who can take you home after the test. Your medicine will make you sleepy. After the exam, you may not drive, take a bus or take a taxi by yourself.   No eating 8 hours before your exam. You may have clear liquids up until 4 hours before your exam. (Clear liquids include water, clear tea, black coffee and fruit juice without pulp.)  IF YOU WILL RECEIVE ANESTHESIA (be asleep for your exam)   Arrive 1 1/2 hours early. Bring someone who can take you home after the test. You may not drive, take a bus or take a taxi by yourself.   No eating 8 hours before your exam. You may have clear liquids up until 4 hours before your exam. (Clear liquids include water, clear tea, black coffee and fruit juice without pulp.)  If you have any questions, please contact your Imaging Department exam site.              Who to contact     If you have questions or need follow up information about today's clinic visit or your schedule please contact [facility] directly at [phone].  Normal or non-critical lab and imaging results will be communicated to you by [employer], letter or phone within 4 business days after the clinic has received the results. If you do not hear from us within 7 days, please contact the clinic through [employer] or phone. If you have a critical or abnormal lab result, we will notify you by phone as soon as possible.  Submit refill requests through [employer] or call your pharmacy and they will forward the refill request to us. Please allow 3 business days for your refill to be completed.          Additional Information About Your Visit        [employer] Information     [employer] gives you secure access to your electronic health record. If you see a primary care provider, you can also send messages to your care team and make appointments. If you have questions, please call your primary care clinic.  If you do not have a primary care provider, please call [phone]  and they will assist you.      [employer] is an electronic gateway that provides easy, online access to your medical records. With [employer], you can request a clinic appointment, read your test results, renew a prescription or communicate with your care team.     To access your existing account, please contact your [facility] or call [phone] for assistance.        Care EveryWhere ID     This is your Care EveryWhere ID. This could be used by other organizations to access your [facility] medical records  [id number]        Your Vitals Were     Pulse Temperature Pulse Oximetry             71 98.2  F (36.8  C) (Oral) 98%          Blood Pressure from Last 3 Encounters:   08/09/17 125/84   07/13/17 120/79   05/09/17 110/75    Weight from Last 3 Encounters:   05/09/17 71.4 kg (157 lb 6.4 oz)   05/04/17 69.4 kg (153 lb)   04/28/17 69.4 kg (153 lb)                 Today's Medication Changes          These changes are accurate as of: 8/9/17 11:59 PM.  If you have any questions, ask your nurse or doctor.               Stop taking these medicines if you haven't already. Please contact your care team if you have questions.     diazepam 5 MG tablet   Commonly known as:  VALIUM   Stopped by:  Dee Dee Russell APRN CNS           HYDROcodone-acetaminophen 5-325 MG per tablet   Commonly known as:  NORCO   Stopped by:  Dee Dee Russell APRN CNS           traZODone 50 MG tablet   Commonly known as:  DESYREL   Stopped by:  Dee Dee Russell APRN CNS                    Primary Care Provider Office Phone # Fax #    Kristine Hattie ASHER Ahumada [phone] [address] [place], [place] [address]        Equal Access to Services     CAMILO SANCHEZ : Hadii mohan Medina, waaxda luqadaha, qaybta kaalmada adeegyada, segun santa. So [facility] [phone].    ATENCIÓN: Si habla español, tiene a matthew disposición servicios gratuitos de  asistencia lingüística. Stevie al [phone].    We comply with applicable federal civil rights laws and [place] laws. We do not discriminate on the basis of race, color, national origin, age, disability sex, sexual orientation or gender identity.            Thank you!     Thank you for choosing [facility]  for your care. Our goal is always to provide you with excellent care. Hearing back from our patients is one way we can continue to improve our services. Please take a few minutes to complete the written survey that you may receive in the mail after your visit with us. Thank you!             Your Updated Medication List - Protect others around you: Learn how to safely use, store and throw away your medicines at www.disposemymeds.org.          This list is accurate as of: 8/9/17 11:59 PM.  Always use your most recent med list.                   Brand Name Dispense Instructions for use Diagnosis    ALEVE PO      Take 220 mg by mouth as needed        IBUPROFEN PO      Take 400 mg by mouth every 6 hours        MELATONIN PO      Take 5 mg by mouth        minocycline 100 MG capsule    MINOCIN/DYNACIN    180 capsule    Take 1 capsule (100 mg) by mouth 2 times daily    Acne cystica, Acne vulgaris       paragard intrauterine copper           * spironolactone 100 MG tablet    ALDACTONE    90 tablet    Take 1 tablet (100 mg) by mouth daily    Acne cystica, Acne vulgaris       * spironolactone 50 MG tablet    ALDACTONE    90 tablet    Take 1 tablet (50 mg) by mouth daily    Acne cystica, Acne vulgaris       triamcinolone 0.1 % cream    KENALOG    454 g    Apply to AA BID x 2-3 weeks then PRN    Atopic neurodermatitis       TYLENOL PO      Take 500 mg by mouth every 4 hours as needed for mild pain or fever        VENTOLIN  (90 BASE) MCG/ACT Inhaler   Generic drug:  albuterol      Inhale 2 puffs into the lungs        * Notice:  This list has 2 medication(s) that are the same as other medications  prescribed for you. Read the directions carefully, and ask your doctor or other care provider to review them with you.

## 2017-08-09 NOTE — PATIENT INSTRUCTIONS
Individualized Surveillance Plan for women  With 20% or greater lifetime risk of breast cancer   Per NCCN Guidelines Version 2.2016   Recommended screening Test or procedure Last done Next Scheduled    Clinical encounter Ongoing risk assessment, risk reduction counseling and clinical breast exam, every 6-12 months.   8/9/2017 April 2018   However, some family histories with breast cancers at a very young age, may warrant screening starting earlier.    *May begin at age 40 if breast cancers in the family occur at later ages.    Annual mammogram beginning 10 years younger than the earliest breast cancer in the family but not prior to age 30.    Recommend annual breast MRI to begin 10 years younger than the earliest breast cancer in the family but not prior to age 25.    Breast MRIs are preferably done on day 7-15 of the menstrual cycle in premenopausal women. 4/18/2017 - Baseline mammogram, BiRads1     No breast MRI to date. NEXT: Breast MRI in October.    Next exam: April 2018, followed by tomosynthesis mammogram   Breast screening for patients at high risk due to thoracic radiation between the ages of 10-30     Begin 8-10 years post radiation treatment or age 25.   Annual mammogram   and  Annual breast MRI, preferably on day 7-15 of menstrual cycle for premenopausal women.    Annual clinical breast exams with ongoing risk assessment and risk reduction counseling until age 25, then every 6-12 months.     NA     NA       Magnetic Resonance Imaging (MRI) of the Breast  Magnetic resonance imaging (MRI) of the breast is an imaging test that uses strong magnets and radio waves to form pictures of the inside of the breast. It also creates images of the tissues that surround the breast. Breast MRI is used to check for problems, such as a leaking breast implant or a suspicious lump or mass. It can also be used to help determine if breast cancer is present and aid in diagnosis and management. Most MRI tests take 30 to  60 minutes. Depending on the type of MRI you are having, the test may take longer. Give yourself extra time to check in.      During a breast MRI, you lie face down on a platform that slides into a tubelike machine called a scanner.   Before your test    Follow any directions you are given for taking medicines and for not eating or drinking before the test.    Follow your normal daily routine unless your provider tells you otherwise.    You ll be asked to remove your watch, hearing aids, credit cards, pens, eyeglasses, and other metal objects.    You may be asked to remove your makeup. Makeup may contain some metal.  MRI uses strong magnets. Metal is affected by magnets and can distort the image. The magnet used in MRI can cause metal objects in your body to move. If you have a metal implant, you may not be able to have an MRI unless the implant is certified as MRI safe. People with these implants should not have an MRI:    Ear (cochlear) implants    Certain clips used for brain aneurysms    Certain metal coils put in blood vessels    Most defibrillators    Most pacemakers  The radiologist or technologist may ask you if you:    Have had stereotactic breast biopsy    Have a pacemaker or implanted cardiac defibrillator    Have an artificial body part (prosthesis)    Have metal rods, screws, plates, or splinters in your body    Wear a medicated adhesive patch    Have tattoos or body piercings. Some tattoo inks contain metal.    Have implanted nerve stimulators or drug-infusion ports    Work with metal    Have braces    Have a bullet or other metal in your body  Tell the radiologist or technologist if you:    Are allergic to any medicines    Are pregnant or think you may be pregnant    Are afraid of small, enclosed spaces (claustrophobic)    Have any serious health problems (If you have kidney disease or a liver transplant  you may not be able to have the contrast material used for MRI)    Have had previous  surgeries    Are breastfeeding   During your test    You may be asked to wear a hospital gown.    You may be given earplugs to wear if you desire.    You may be injected with contrast through an intravenous (IV) line in your hand or arm. This is a special dye that makes the MRI image sharp that may be needed depending on the reason for your exam.    You ll lie on a platform that slides into a tube-like machine called a scanner. You ll be on your stomach with your breasts placed through openings in the platform.    Remain as still as you can while the camera takes the pictures. This will ensure the best images.  After your test    You can get back to normal activities right away.    If you were given contrast, it will pass naturally through your body within a day.    Drink lots of water so that the dye passes quickly out of your body.  Getting your results  Your healthcare provider will discuss the test results with you during a follow-up appointment or over the phone.  Date Last Reviewed: 2/1/2017 2000-2017 [employer]. [address] [place] [address], [place] [address]. All rights reserved. This information is not intended as a substitute for professional medical care. Always follow your healthcare professional's instructions.

## 2017-08-29 ENCOUNTER — OFFICE VISIT (OUTPATIENT)
Dept: FAMILY MEDICINE | Facility: CLINIC | Age: 32
End: 2017-08-29
Payer: COMMERCIAL

## 2017-08-29 VITALS
DIASTOLIC BLOOD PRESSURE: 70 MMHG | HEART RATE: 84 BPM | TEMPERATURE: 98.6 F | BODY MASS INDEX: 24.71 KG/M2 | HEIGHT: 68 IN | WEIGHT: 163 LBS | SYSTOLIC BLOOD PRESSURE: 100 MMHG

## 2017-08-29 DIAGNOSIS — R10.2 PELVIC PAIN IN FEMALE: ICD-10-CM

## 2017-08-29 DIAGNOSIS — G89.29 CHRONIC RIGHT-SIDED THORACIC BACK PAIN: Primary | ICD-10-CM

## 2017-08-29 DIAGNOSIS — M54.6 CHRONIC RIGHT-SIDED THORACIC BACK PAIN: Primary | ICD-10-CM

## 2017-08-29 PROCEDURE — 99214 OFFICE O/P EST MOD 30 MIN: CPT | Performed by: NURSE PRACTITIONER

## 2017-08-29 RX ORDER — CYCLOBENZAPRINE HCL 10 MG
5-10 TABLET ORAL 3 TIMES DAILY PRN
Qty: 30 TABLET | Refills: 1 | Status: SHIPPED | OUTPATIENT
Start: 2017-08-29

## 2017-08-29 NOTE — NURSING NOTE
"Chief Complaint   Patient presents with     Back Pain       Initial /70  Pulse 84  Temp 98.6  F (37  C) (Tympanic)  Ht 5' 7.5\" (1.715 m)  Wt 163 lb (73.9 kg)  BMI 25.15 kg/m2 Estimated body mass index is 25.15 kg/(m^2) as calculated from the following:    Height as of this encounter: 5' 7.5\" (1.715 m).    Weight as of this encounter: 163 lb (73.9 kg).  Medication Reconciliation: complete    Health Maintenance that is potentially due pending provider review:  NONE    n/a    Is there anyone who you would like to be able to receive your results? No  If yes have patient fill out RADHA    Andreia Rojas CMA    "

## 2017-08-29 NOTE — PROGRESS NOTES
SUBJECTIVE:   July Delaney is a 32 year old female who presents to clinic today for the following health issues:      Back Pain       Duration: 1 year         Specific cause: none    Description:   Location of pain: right side back pain under ribs and right hip pain   Character of pain: dull ache and sharp at times   Pain radiation:none  New numbness or weakness in legs, not attributed to pain:  no     Intensity: moderate    History:   Pain interferes with job: No  History of back problems: no prior back problems  Any previous MRI or X-rays: none   Sees a specialist for back pain:  No  Therapies tried without relief:surgery  Ovarian cysts in past - pain in worse with menstrual cycle     Alleviating factors:   Improved by: heat      Precipitating factors:  Worsened by: menstrual cycle     Functional and Psychosocial Screen (Rpachi STarT Back):      Not performed today    Worsening symptoms seem to correlate with menses-right pelvic pain with these symptoms  Massage helps      Problem list and histories reviewed & adjusted, as indicated.  Additional history: as documented    Patient Active Problem List   Diagnosis     Horseshoe kidney     Family history of malignant neoplasm of breast     Family history of uterine cancer     Family history of prostate cancer     Acne vulgaris     Flexural atopic dermatitis     Menorrhagia     Mild intermittent asthma     Past Surgical History:   Procedure Laterality Date     CHOLECYSTECTOMY       LAPAROSCOPIC CHOLECYSTECTOMY N/A 5/4/2017    Procedure: LAPAROSCOPIC CHOLECYSTECTOMY;  Laparoscopic Cholecystectomy;  Surgeon: Modesto Tamez MD;  Location: [facility]       Social History   Substance Use Topics     Smoking status: Former Smoker     Packs/day: 1.00     Years: 5.00     Types: Cigarettes     Start date: 8/9/1993     Quit date: 8/9/2011     Smokeless tobacco: Never Used      Comment: stopped for 3-4 years     Alcohol use 0.6 oz/week     1 Cans of beer per week      Comment: 1 beer  "every 2 nights     Family History   Problem Relation Age of Onset     Uterine Cancer Maternal Grandmother 50     Breast Cancer Maternal Aunt 29      at 33     Ovarian Cancer Maternal Aunt      Breast Cancer Maternal Aunt 33     unilateral radical mastectomy     Ovarian Cancer Maternal Aunt      Prostate Cancer Paternal Grandfather 70         Current Outpatient Prescriptions   Medication Sig Dispense Refill     MELATONIN PO Take 5 mg by mouth       spironolactone (ALDACTONE) 100 MG tablet Take 1 tablet (100 mg) by mouth daily 90 tablet 1     spironolactone (ALDACTONE) 50 MG tablet Take 1 tablet (50 mg) by mouth daily 90 tablet 3     minocycline (MINOCIN/DYNACIN) 100 MG capsule Take 1 capsule (100 mg) by mouth 2 times daily 180 capsule 0     IBUPROFEN PO Take 400 mg by mouth every 6 hours       Naproxen Sodium (ALEVE PO) Take 220 mg by mouth as needed        Acetaminophen (TYLENOL PO) Take 500 mg by mouth every 4 hours as needed for mild pain or fever       paragard intrauterine copper        albuterol (VENTOLIN HFA) 108 (90 BASE) MCG/ACT inhaler Inhale 2 puffs into the lungs       triamcinolone (KENALOG) 0.1 % cream Apply to AA BID x 2-3 weeks then  g 1     Allergies   Allergen Reactions     Amoxicillin Itching     Apap-Pyrilamine Hives     midol     Ceftriaxone Hives     Rocephin     Cephalexin Itching     Ciprofloxacin Itching     Labs reviewed in EPIC      Reviewed and updated as needed this visit by clinical staff       Reviewed and updated as needed this visit by Provider         ROS:  Constitutional, HEENT, cardiovascular, pulmonary, gi and gu systems are negative, except as otherwise noted.      OBJECTIVE:   /70  Pulse 84  Temp 98.6  F (37  C) (Tympanic)  Ht 5' 7.5\" (1.715 m)  Wt 163 lb (73.9 kg)  BMI 25.15 kg/m2  Body mass index is 25.15 kg/(m^2).  GENERAL: healthy, alert and no distress  NECK: no adenopathy  RESP: lungs clear to auscultation - no rales, rhonchi or wheezes  CV: regular " rate and rhythm, normal S1 S2, no S3 or S4, no murmur, click or rub, no peripheral edema and peripheral pulses strong  ABDOMEN: tenderness right suprapubic and bowel sounds normal  MS: tenderness to palpation right posterior lateral 11 and 12 ribs    Diagnostic Test Results:  none     ASSESSMENT/PLAN:     1. Chronic right-sided thoracic back pain  Will try flexeril for symptoms.  Symptoms seem to be worse with menses so will work up for ovarian cysts.  - cyclobenzaprine (FLEXERIL) 10 MG tablet; Take 0.5-1 tablets (5-10 mg) by mouth 3 times daily as needed for muscle spasms  Dispense: 30 tablet; Refill: 1    2. Pelvic pain in female  Ultrasound ordered for further evaluation.  OB/GYN referral-wanted to consider options to help with pain-discussion on ablation.  - US Pelvic Complete with Transvaginal; Future  - OB/GYN REFERRAL    Home care instructions were reviewed with the patient. The risks, benefits and treatment options of prescribed medications or other treatments have been discussed with the patient. The patient verbalized their understanding and should call or follow up if no improvement or if they develop further problems.      Patient Instructions   Flexeril as needed for back pain    Please call [phone] to schedule your pelvic ultrasound      ASHER Epstein [facility]

## 2017-08-29 NOTE — MR AVS SNAPSHOT
After Visit Summary   8/29/2017    July Delaney    MRN: [id number]           Patient Information     Date Of Birth          1985        Visit Information        Provider Department      8/29/2017 8:20 AM Kristine Ahumada APRN [facility]        Today's Diagnoses     Chronic right-sided thoracic back pain    -  1    Pelvic pain in female          Care Instructions    Flexeril as needed for back pain    Please call [phone] to schedule your pelvic ultrasound          Follow-ups after your visit        Additional Services     OB/GYN REFERRAL       Your provider has referred you to:  [facility]: [facility] [phone]   [email or web address]    Please be aware that coverage of these services is subject to the terms and limitations of your health insurance plan.  Call member services at your health plan with any benefit or coverage questions.      Please bring the following with you to your appointment:    (1) Any X-Rays, CTs or MRIs which have been performed.  Contact the facility where they were done to arrange for  prior to your scheduled appointment.   (2) List of current medications   (3) This referral request   (4) Any documents/labs given to you for this referral                  Your next 10 appointments already scheduled     Oct 25, 2017 11:00 AM CDT   Return Visit with ASHER Moon [facility])    [address] [place] [id number] [phone]            Oct 25, 2017 11:45 AM CDT   MR BREAST BILATERAL W/O & W CONTRAST with MGMR1   [facility]    [address] [place] [id number] [phone]           Take your medicines as usual, unless your doctor tells you not to. Bring a list of your current medicines to your exam (including vitamins, minerals and over-the-counter  drugs).  The timing of your exam may depend on the start of your last period. If you re in menopause, you may have the exam anytime.  Please bring any previous mammograms or breast MRIs from other facilities to the MRI dept. Do not mail these items to us.  You will have contrast for this exam. To prepare:   The day before your exam, drink extra fluids at least six 8-ounce glasses (unless your doctor tells you to restrict your fluids).   Have a blood test (creatinine test) within 30 days of your exam. Go to your clinic or Diagnostic Imaging Department for this test.  The MRI machine uses a strong magnet. Please wear clothes without metal (snaps, zippers). A sweatsuit works well, or we may give you a hospital gown.  Please remove any body piercings and hair extensions before you arrive. You will also remove watches, jewelry, hairpins, wallets, dentures, partial dental plates and hearing aids. You may wear contact lenses, and you may be able to wear your rings. We have a safe place to keep your personal items, but it is safer to leave them at home.   **IMPORTANT** THE INSTRUCTIONS BELOW ARE ONLY FOR THOSE PATIENTS WHO HAVE BEEN TOLD THEY WILL RECEIVE SEDATION OR GENERAL ANESTHESIA DURING THEIR MRI PROCEDURE:  IF YOU WILL RECEIVE SEDATION (take medicine to help you relax during your exam)   You must get the medicine from your doctor before you arrive. Bring the medicine to the exam. Do not take it at home.   Arrive one hour early. Bring someone who can take you home after the test. Your medicine will make you sleepy. After the exam, you may not drive, take a bus or take a taxi by yourself.   No eating 8 hours before your exam. You may have clear liquids up until 4 hours before your exam. (Clear liquids include water, clear tea, black coffee and fruit juice without pulp.)  IF YOU WILL RECEIVE ANESTHESIA (be asleep for your exam)   Arrive 1 1/2 hours early. Bring someone who can take you home after the test. You may not  "drive, take a bus or take a taxi by yourself.   No eating 8 hours before your exam. You may have clear liquids up until 4 hours before your exam. (Clear liquids include water, clear tea, black coffee and fruit juice without pulp.)  If you have any questions, please contact your Imaging Department exam site.              Future tests that were ordered for you today     Open Future Orders        Priority Expected Expires Ordered    US Pelvic Complete with Transvaginal Routine  8/30/2018 8/29/2017            Who to contact     If you have questions or need follow up information about today's clinic visit or your schedule please contact [facility] directly at [phone].  Normal or non-critical lab and imaging results will be communicated to you by MdotLabshart, letter or phone within 4 business days after the clinic has received the results. If you do not hear from us within 7 days, please contact the clinic through [facility] or phone. If you have a critical or abnormal lab result, we will notify you by phone as soon as possible.  Submit refill requests through [employer] or call your pharmacy and they will forward the refill request to us. Please allow 3 business days for your refill to be completed.          Additional Information About Your Visit        MdotLabshart Information     [employer] gives you secure access to your electronic health record. If you see a primary care provider, you can also send messages to your care team and make appointments. If you have questions, please call your primary care clinic.  If you do not have a primary care provider, please call [phone] and they will assist you.        Care EveryWhere ID     This is your Care EveryWhere ID. This could be used by other organizations to access your [facility] medical records  [id number]        Your Vitals Were     Pulse Temperature Height BMI (Body Mass Index)          84 98.6  F (37  C) (Tympanic) 5' 7.5\" (1.715 m) 25.15 kg/m2         " Blood Pressure from Last 3 Encounters:   08/29/17 100/70   08/09/17 125/84   07/13/17 120/79    Weight from Last 3 Encounters:   08/29/17 163 lb (73.9 kg)   05/09/17 157 lb 6.4 oz (71.4 kg)   05/04/17 153 lb (69.4 kg)              We Performed the Following     OB/GYN REFERRAL          Today's Medication Changes          These changes are accurate as of: 8/29/17  9:14 AM.  If you have any questions, ask your nurse or doctor.               Start taking these medicines.        Dose/Directions    cyclobenzaprine 10 MG tablet   Commonly known as:  FLEXERIL   Used for:  Chronic right-sided thoracic back pain   Started by:  Kristine Ahumada APRN CNP        Dose:  5-10 mg   Take 0.5-1 tablets (5-10 mg) by mouth 3 times daily as needed for muscle spasms   Quantity:  30 tablet   Refills:  1            Where to get your medicines      These medications were sent to [facility] [address] [place] [address]  [address] [id number]     Phone:  [phone]     cyclobenzaprine 10 MG tablet                Primary Care Provider Office Phone # Fax #    ASHER Hampton [phone] [address] [place] [address]        Equal Access to Services     NIALL SANCHEZ AH: Hadii mohan banuelos hadasho Soomaali, waaxda luqadaha, qaybta kaalmada adeegyada, segun boone hayreynold santa. So [facility] [phone].    ATENCIÓN: Si habla español, tiene a matthew disposición servicios gratuitos de asistencia lingüística. Llame al [phone].    We comply with applicable federal civil rights laws and [place] laws. We do not discriminate on the basis of race, color, national origin, age, disability sex, sexual orientation or gender identity.            Thank you!     Thank you for choosing [facility]  for your care. Our goal is always to provide you with excellent care. Hearing back from our patients is one way we can continue to improve our  services. Please take a few minutes to complete the written survey that you may receive in the mail after your visit with us. Thank you!             Your Updated Medication List - Protect others around you: Learn how to safely use, store and throw away your medicines at www.disposemymeds.org.          This list is accurate as of: 8/29/17  9:14 AM.  Always use your most recent med list.                   Brand Name Dispense Instructions for use Diagnosis    ALEVE PO      Take 220 mg by mouth as needed        cyclobenzaprine 10 MG tablet    FLEXERIL    30 tablet    Take 0.5-1 tablets (5-10 mg) by mouth 3 times daily as needed for muscle spasms    Chronic right-sided thoracic back pain       IBUPROFEN PO      Take 400 mg by mouth every 6 hours        MELATONIN PO      Take 5 mg by mouth        minocycline 100 MG capsule    MINOCIN/DYNACIN    180 capsule    Take 1 capsule (100 mg) by mouth 2 times daily    Acne cystica, Acne vulgaris       paragard intrauterine copper           * spironolactone 100 MG tablet    ALDACTONE    90 tablet    Take 1 tablet (100 mg) by mouth daily    Acne cystica, Acne vulgaris       * spironolactone 50 MG tablet    ALDACTONE    90 tablet    Take 1 tablet (50 mg) by mouth daily    Acne cystica, Acne vulgaris       triamcinolone 0.1 % cream    KENALOG    454 g    Apply to AA BID x 2-3 weeks then PRN    Atopic neurodermatitis       TYLENOL PO      Take 500 mg by mouth every 4 hours as needed for mild pain or fever        VENTOLIN  (90 BASE) MCG/ACT Inhaler   Generic drug:  albuterol      Inhale 2 puffs into the lungs        * Notice:  This list has 2 medication(s) that are the same as other medications prescribed for you. Read the directions carefully, and ask your doctor or other care provider to review them with you.

## 2017-10-25 ENCOUNTER — RADIANT APPOINTMENT (OUTPATIENT)
Dept: MRI IMAGING | Facility: CLINIC | Age: 32
End: 2017-10-25
Attending: CLINICAL NURSE SPECIALIST
Payer: COMMERCIAL

## 2017-10-25 DIAGNOSIS — Z80.3 FAMILY HISTORY OF MALIGNANT NEOPLASM OF BREAST: ICD-10-CM

## 2017-10-25 DIAGNOSIS — Z12.39 ENCOUNTER FOR BREAST CANCER SCREENING OTHER THAN MAMMOGRAM: ICD-10-CM

## 2017-10-25 PROCEDURE — A9585 GADOBUTROL INJECTION: HCPCS | Performed by: CLINICAL NURSE SPECIALIST

## 2017-10-25 PROCEDURE — 77059 MR BREAST BILATERAL W/O & W CONTRAST: CPT | Performed by: STUDENT IN AN ORGANIZED HEALTH CARE EDUCATION/TRAINING PROGRAM

## 2017-10-25 PROCEDURE — 0159T MR BREAST BILATERAL W/O & W CONTRAST: CPT | Performed by: STUDENT IN AN ORGANIZED HEALTH CARE EDUCATION/TRAINING PROGRAM

## 2017-10-25 RX ORDER — GADOBUTROL 604.72 MG/ML
7.5 INJECTION INTRAVENOUS ONCE
Status: COMPLETED | OUTPATIENT
Start: 2017-10-25 | End: 2017-10-25

## 2017-10-25 RX ADMIN — GADOBUTROL 7 ML: 604.72 INJECTION INTRAVENOUS at 13:58

## 2017-10-26 DIAGNOSIS — Z80.3 FAMILY HISTORY OF BREAST CANCER: Primary | ICD-10-CM

## 2017-10-26 DIAGNOSIS — Z12.31 ENCOUNTER FOR SCREENING MAMMOGRAM FOR HIGH-RISK PATIENT: ICD-10-CM

## 2017-10-26 DIAGNOSIS — Z12.39 BREAST CANCER SCREENING, HIGH RISK PATIENT: ICD-10-CM

## 2018-01-25 ENCOUNTER — MYC REFILL (OUTPATIENT)
Dept: DERMATOLOGY | Facility: CLINIC | Age: 33
End: 2018-01-25

## 2018-01-25 DIAGNOSIS — L70.0 ACNE CYSTICA: ICD-10-CM

## 2018-01-25 DIAGNOSIS — L70.0 ACNE VULGARIS: ICD-10-CM

## 2018-01-25 RX ORDER — SPIRONOLACTONE 100 MG/1
100 TABLET, FILM COATED ORAL DAILY
Qty: 30 TABLET | Refills: 0 | Status: SHIPPED | OUTPATIENT
Start: 2018-01-25 | End: 2018-01-26

## 2018-01-25 RX ORDER — SPIRONOLACTONE 50 MG/1
50 TABLET, FILM COATED ORAL DAILY
Qty: 30 TABLET | Refills: 0 | Status: SHIPPED | OUTPATIENT
Start: 2018-01-25 | End: 2018-08-03

## 2018-01-25 NOTE — TELEPHONE ENCOUNTER
Refill sent to patient's preferred pharmacy. Informed via MyChart and prescription that she is to be seen prior to any further refills. LOV 7/13/17- was to have follow-up in 6 months. No current appointment made     Dominick CORONADO   [facility] RN

## 2018-01-25 NOTE — TELEPHONE ENCOUNTER
Message from [facility]:  Original authorizing provider: LANA Hancockolman Velezfahad would like a refill of the following medications:  spironolactone (ALDACTONE) 100 MG tablet [Matilde Platt PA-C]  spironolactone (ALDACTONE) 50 MG tablet [Matiled Platt PA-C]    Preferred pharmacy: [facility] [address] [place] [address]    Comment:     REVIEW OF SYSTEMS:   GENERAL:  Patient denies fevers, chills, night sweats, anorexia, weight loss, but complains of: excessive fatigue and normal level of fatigue for patient.   ALLERGIC/IMMUNOLOGIC: SEE LIST  ENT/MOUTH:  Patient denies problems with hearing, sore throat, sinus drainage, but complains of: mucositis and mouth sores-patient stated that the sore may be from her dentures.   HEMATOLOGIC/LYMPHATIC: Patient denies tender or palpable lymph nodes, but complains of:  easy bruising or bleeding-with blood draws.  RESPIRATORY:  Patient denies chest pain, hemoptysis, but complains of: dyspnea, cough-productive cough-clear and white at times SOB with activity  CARDIOVASCULAR:  Patient denies anginal chest pain, palpitations, orthopnea, but complains of: non-pitting peripheral edema bilateral ankles-edema is on and off.   GASTROINTESTINAL: Patient denies nausea, vomiting, diarrhea, GI bleeding, constipation, change in bowel habits, heartburn, early satiety   (FEMALE):  Patient denies  abnormal genital masses, hematuria, hesitancy, incontinence, vaginal bleeding, discharge, other problems with urination, problems with sexual activity  MUSCULOSKELETAL:  Patient denies  joint pain, swelling or redness, decreased range of motion  SKIN:  Patient denies chronic rashes, inflammation, ulcerations or skin changes  NEUROLOGIC:  Patient denies headache, blurred vision, areas of focal weakness or numbness, abnormal gait, sensory problems  PSYCHIATRIC: Patient denies anxiety, insomnia, depression, gaurav or mood swings, psychotropic drugs

## 2018-07-16 ENCOUNTER — TELEPHONE (OUTPATIENT)
Dept: ONCOLOGY | Facility: CLINIC | Age: 33
End: 2018-07-16

## 2018-07-16 NOTE — TELEPHONE ENCOUNTER
I called and left a message for the pt to call back to get scheduled with Dee Dee Russell w/ [facility] to follow appointment, she is overdue for her f/u appointment.

## 2018-08-03 ENCOUNTER — MYC REFILL (OUTPATIENT)
Dept: DERMATOLOGY | Facility: CLINIC | Age: 33
End: 2018-08-03

## 2018-08-03 DIAGNOSIS — L70.0 ACNE CYSTICA: ICD-10-CM

## 2018-08-03 DIAGNOSIS — L70.0 ACNE VULGARIS: ICD-10-CM

## 2018-08-03 DIAGNOSIS — L20.81 ATOPIC NEURODERMATITIS: ICD-10-CM

## 2018-08-03 RX ORDER — TRIAMCINOLONE ACETONIDE 1 MG/G
CREAM TOPICAL
Qty: 80 G | Refills: 0 | Status: SHIPPED | OUTPATIENT
Start: 2018-08-03

## 2018-08-03 RX ORDER — SPIRONOLACTONE 50 MG/1
50 TABLET, FILM COATED ORAL DAILY
Qty: 30 TABLET | Refills: 0 | Status: SHIPPED | OUTPATIENT
Start: 2018-08-03 | End: 2018-08-07

## 2018-08-03 RX ORDER — SPIRONOLACTONE 100 MG/1
100 TABLET, FILM COATED ORAL DAILY
Qty: 30 TABLET | Refills: 0 | Status: SHIPPED | OUTPATIENT
Start: 2018-08-03 | End: 2018-08-07

## 2018-08-03 NOTE — LETTER
[facility] [place]  [address]  [facility] [address]  Phone: [phone]    August 3, 2018    July Delaney                                                                                                                       [facility] [place]  [facility] [address]            Dear MsYossi Rosiekailees,    We are concerned about your health care.  We recently provided you with a medication refill.  Many medications require routine follow-up with your Dermatology Provider.     At this time we ask that: You schedule a routine office visit with your Dermatology Provider to follow your Acne. Per 2017 Dermatology office visit dictation, you were to return to Dermatology clinic in 6 months for a recheck Acne appointment and were notified of need per  My chart message:        18 10:39 AM   Zee Mcdowell,     We have received a medication refill request for Spironolactone. This will be filled for you and sent to [facility] in [place].   One of our providers would like to see you in clinic for a 6 month follow-up for further refills. Please call [phone] to schedule that appointment. We are currently scheduling out 4 weeks.   Thank you,   Dominick CORONADO   [facility] RN      Last read by July Delaney at 9:25 PM on 2018.     Your prescription: Is  and has been refilled once so you may have time for the above noted follow-up. We are currently booking appointments 6 to 8 weeks in advance.       Thank you,      Matilde Antunez PA-C/ radha

## 2018-08-03 NOTE — TELEPHONE ENCOUNTER
> 1 year and was to return in 6 months per 7-25-17 dictation. Needs appointment. Letter sent and note sent to pharmacy as well. Aarti Finch RN

## 2018-08-03 NOTE — TELEPHONE ENCOUNTER
Message from MessageOnehart:  Original authorizing provider: LANA Hancockolman Wilkersons would like a refill of the following medications:  triamcinolone (KENALOG) 0.1 % cream [Matilde Platt PA-C]  spironolactone (ALDACTONE) 50 MG tablet [Matilde Platt PA-C]  spironolactone (ALDACTONE) 100 MG tablet [Matilde Platt PA-C]    Preferred pharmacy: [facility] [address] [place] [address]    Comment:

## 2020-03-10 ENCOUNTER — HEALTH MAINTENANCE LETTER (OUTPATIENT)
Age: 35
End: 2020-03-10

## 2020-12-27 ENCOUNTER — HEALTH MAINTENANCE LETTER (OUTPATIENT)
Age: 35
End: 2020-12-27

## 2021-04-24 ENCOUNTER — HEALTH MAINTENANCE LETTER (OUTPATIENT)
Age: 36
End: 2021-04-24

## 2021-10-09 ENCOUNTER — HEALTH MAINTENANCE LETTER (OUTPATIENT)
Age: 36
End: 2021-10-09

## 2022-05-16 ENCOUNTER — HEALTH MAINTENANCE LETTER (OUTPATIENT)
Age: 37
End: 2022-05-16

## 2022-09-11 ENCOUNTER — HEALTH MAINTENANCE LETTER (OUTPATIENT)
Age: 37
End: 2022-09-11

## 2023-06-03 ENCOUNTER — HEALTH MAINTENANCE LETTER (OUTPATIENT)
Age: 38
End: 2023-06-03

## 2023-10-11 NOTE — MR AVS SNAPSHOT
After Visit Summary   7/13/2017    July Delaney    MRN: [id number]           Patient Information     Date Of Birth          1985        Visit Information        Provider Department      7/13/2017 9:30 AM Matilde Antunez PA-C [facility]        Today's Diagnoses     Acne vulgaris    -  1    Acne cystica           Follow-ups after your visit        Your next 10 appointments already scheduled     Jul 26, 2017  1:00 PM CDT   [facility]   [facility]    [address] [place] [id number] [phone]              Who to contact     If you have questions or need follow up information about today's clinic visit or your schedule please contact [facility] directly at [phone].  Normal or non-critical lab and imaging results will be communicated to you by MyChart, letter or phone within 4 business days after the clinic has received the results. If you do not hear from us within 7 days, please contact the clinic through MyChart or phone. If you have a critical or abnormal lab result, we will notify you by phone as soon as possible.  Submit refill requests through [employer] or call your pharmacy and they will forward the refill request to us. Please allow 3 business days for your refill to be completed.          Additional Information About Your Visit        MyChart Information     [employer] gives you secure access to your electronic health record. If you see a primary care provider, you can also send messages to your care team and make appointments. If you have questions, please call your primary care clinic.  If you do not have a primary care provider, please call [phone] and they will assist you.        Care EveryWhere ID     This is your Care EveryWhere ID. This could be used by other organizations to access your [place] medical records  [id number]        Your Vitals  Were     Pulse Pulse Oximetry                66 100%           Blood Pressure from Last 3 Encounters:   07/13/17 120/79   05/09/17 110/75   05/04/17 120/65    Weight from Last 3 Encounters:   05/09/17 71.4 kg (157 lb 6.4 oz)   05/04/17 69.4 kg (153 lb)   04/28/17 69.4 kg (153 lb)              Today, you had the following     No orders found for display         Today's Medication Changes          These changes are accurate as of: 7/13/17 12:47 PM.  If you have any questions, ask your nurse or doctor.               Start taking these medicines.        Dose/Directions    minocycline 100 MG capsule   Commonly known as:  MINOCIN/DYNACIN   Used for:  Acne cystica, Acne vulgaris   Started by:  Matilde Antunez PA-C        Dose:  100 mg   Take 1 capsule (100 mg) by mouth 2 times daily   Quantity:  180 capsule   Refills:  0         These medicines have changed or have updated prescriptions.        Dose/Directions    * spironolactone 100 MG tablet   Commonly known as:  ALDACTONE   This may have changed:  additional instructions   Used for:  Acne cystica, Acne vulgaris   Changed by:  Matilde Antunez PA-C        Dose:  100 mg   Take 1 tablet (100 mg) by mouth daily   Quantity:  90 tablet   Refills:  1       * spironolactone 50 MG tablet   Commonly known as:  ALDACTONE   This may have changed:  You were already taking a medication with the same name, and this prescription was added. Make sure you understand how and when to take each.   Used for:  Acne cystica, Acne vulgaris   Changed by:  Matilde Antunez PA-C        Dose:  50 mg   Take 1 tablet (50 mg) by mouth daily   Quantity:  90 tablet   Refills:  3       * Notice:  This list has 2 medication(s) that are the same as other medications prescribed for you. Read the directions carefully, and ask your doctor or other care provider to review them with you.      Stop taking these medicines if you haven't already. Please contact your care team if you have questions.      sulfamethoxazole-trimethoprim 400-80 MG per tablet   Commonly known as:  BACTRIM/SEPTRA   Stopped by:  Matilde Antunez PA-C                Where to get your medicines      These medications were sent to [facility] [place], [place] - [address] [facility]  [address] [facility] [address]     Phone:  [phone]     minocycline 100 MG capsule    spironolactone 100 MG tablet    spironolactone 50 MG tablet                Primary Care Provider Office Phone # Fax #    Kristine Kuhn ASHER Ahumada [phone]       [facility] [address] [facility] [address]        Equal Access to Services     NIALL SANCHEZ : Hadii mohan banuelos hadasho Sobolivar, waaxda luqadaha, qaybta kaalmada adecheyenneyada, segun santa. So [facility] [phone].    ATENCIÓN: Si habla español, tiene a matthew disposición servicios gratuitos de asistencia lingüística. Ll[facility] [phone].    We comply with applicable federal civil rights laws and [place] laws. We do not discriminate on the basis of race, color, national origin, age, disability sex, sexual orientation or gender identity.            Thank you!     Thank you for choosing [facility]  for your care. Our goal is always to provide you with excellent care. Hearing back from our patients is one way we can continue to improve our services. Please take a few minutes to complete the written survey that you may receive in the mail after your visit with us. Thank you!             Your Updated Medication List - Protect others around you: Learn how to safely use, store and throw away your medicines at www.disposemymeds.org.          This list is accurate as of: 7/13/17 12:47 PM.  Always use your most recent med list.                   Brand Name Dispense Instructions for use Diagnosis    ALEVE PO      Take 220 mg by mouth daily        HYDROcodone-acetaminophen 5-325 MG per tablet    NORCO    45 tablet    Take 1-2  tablets by mouth every 6 hours as needed    Post-op pain       IBUPROFEN PO      Take 400 mg by mouth every 6 hours        minocycline 100 MG capsule    MINOCIN/DYNACIN    180 capsule    Take 1 capsule (100 mg) by mouth 2 times daily    Acne cystica, Acne vulgaris       paragard intrauterine copper           * spironolactone 100 MG tablet    ALDACTONE    90 tablet    Take 1 tablet (100 mg) by mouth daily    Acne cystica, Acne vulgaris       * spironolactone 50 MG tablet    ALDACTONE    90 tablet    Take 1 tablet (50 mg) by mouth daily    Acne cystica, Acne vulgaris       triamcinolone 0.1 % cream    KENALOG    454 g    Apply to AA BID x 2-3 weeks then PRN    Atopic neurodermatitis       TYLENOL PO      Take 500 mg by mouth every 4 hours as needed for mild pain or fever        VENTOLIN  (90 BASE) MCG/ACT Inhaler   Generic drug:  albuterol      Inhale 2 puffs into the lungs        * Notice:  This list has 2 medication(s) that are the same as other medications prescribed for you. Read the directions carefully, and ask your doctor or other care provider to review them with you.       Imiquimod Counseling:  I discussed with the patient the risks of imiquimod including but not limited to erythema, scaling, itching, weeping, crusting, and pain.  Patient understands that the inflammatory response to imiquimod is variable from person to person and was educated regarded proper titration schedule.  If flu-like symptoms develop, patient knows to discontinue the medication and contact us.

## (undated) DEVICE — GOWN XLG DISP 9545

## (undated) DEVICE — GOWN LG DISP 9515

## (undated) DEVICE — ADHESIVE SWIFTSET 0.8ML OCTYL SS6

## (undated) DEVICE — GLOVE PROTEXIS W/NEU-THERA 7.5  2D73TE75

## (undated) DEVICE — Device

## (undated) DEVICE — ENDO SHEARS RENEW LAP ENDOCUT SCISSOR TIP 16.5MM 3142

## (undated) DEVICE — DRAPE POUCH INSTRUMENT 3 POCKET 1018L

## (undated) DEVICE — STOCKING SLEEVE COMPRESSION CALF LG

## (undated) DEVICE — ENDO TROCAR BLUNT 100MM W/THRD ANCHOR BLUNTPORT BPT12STS

## (undated) DEVICE — CLIP APPLIER ENDO 10MM M/L 176657

## (undated) DEVICE — GLOVE PROTEXIS MICRO 7.0  2D73PM70

## (undated) DEVICE — SOL WATER IRRIG 1000ML BOTTLE 07139-09

## (undated) DEVICE — SOL NACL 0.9% IRRIG 1000ML BOTTLE 07138-09

## (undated) DEVICE — PREP CHLORAPREP 26ML TINTED ORANGE  260815

## (undated) DEVICE — DECANTER VIAL 2006S

## (undated) DEVICE — TUBING HYDRO-SURG PLUS LAP IRRIGATOR SUCTION 0026870

## (undated) DEVICE — GLOVE PROTEXIS BLUE W/NEU-THERA 8.0  2D73EB80

## (undated) DEVICE — SU VICRYL 4-0 FS-2 27" J422-H

## (undated) DEVICE — GLOVE PROTEXIS BLUE W/NEU-THERA 7.0  2D73EB70

## (undated) DEVICE — SU VICRYL 0 UR-6 27" J603H

## (undated) DEVICE — ENDO POUCH GOLD 10MM ECATCH 173050G

## (undated) DEVICE — ENDO TROCAR BLADED 11MM STD VERSAONE B11STF

## (undated) DEVICE — GLOVE PROTEXIS W/NEU-THERA 8.0  2D73TE80

## (undated) DEVICE — ENDO CANNULA 05MM VERSAONE UNIVERSAL UNVCA5STF

## (undated) DEVICE — ESU HOLSTER PLASTIC DISP E2400

## (undated) DEVICE — ENDO TROCAR 05MM VERSAONE BLADED W/STD FIX CANNULA B5STF

## (undated) DEVICE — SOL NACL 0.9% IRRIG 3000ML BAG 07972-08

## (undated) RX ORDER — FENTANYL CITRATE 50 UG/ML
INJECTION, SOLUTION INTRAMUSCULAR; INTRAVENOUS
Status: DISPENSED
Start: 2017-05-04

## (undated) RX ORDER — BUPIVACAINE HYDROCHLORIDE AND EPINEPHRINE 2.5; 5 MG/ML; UG/ML
INJECTION, SOLUTION INFILTRATION; PERINEURAL
Status: DISPENSED
Start: 2017-05-04

## (undated) RX ORDER — SCOLOPAMINE TRANSDERMAL SYSTEM 1 MG/1
PATCH, EXTENDED RELEASE TRANSDERMAL
Status: DISPENSED
Start: 2017-05-04

## (undated) RX ORDER — CLINDAMYCIN PHOSPHATE 900 MG/50ML
INJECTION, SOLUTION INTRAVENOUS
Status: DISPENSED
Start: 2017-05-04

## (undated) RX ORDER — ONDANSETRON 2 MG/ML
INJECTION INTRAMUSCULAR; INTRAVENOUS
Status: DISPENSED
Start: 2017-05-04

## (undated) RX ORDER — HYDROXYZINE HYDROCHLORIDE 25 MG/1
TABLET, FILM COATED ORAL
Status: DISPENSED
Start: 2017-05-04

## (undated) RX ORDER — LIDOCAINE HYDROCHLORIDE 10 MG/ML
INJECTION, SOLUTION EPIDURAL; INFILTRATION; INTRACAUDAL; PERINEURAL
Status: DISPENSED
Start: 2017-05-04

## (undated) RX ORDER — DEXAMETHASONE SODIUM PHOSPHATE 4 MG/ML
INJECTION, SOLUTION INTRA-ARTICULAR; INTRALESIONAL; INTRAMUSCULAR; INTRAVENOUS; SOFT TISSUE
Status: DISPENSED
Start: 2017-05-04

## (undated) RX ORDER — HYDROMORPHONE HYDROCHLORIDE 1 MG/ML
INJECTION, SOLUTION INTRAMUSCULAR; INTRAVENOUS; SUBCUTANEOUS
Status: DISPENSED
Start: 2017-05-04

## (undated) RX ORDER — GLYCOPYRROLATE 0.2 MG/ML
INJECTION, SOLUTION INTRAMUSCULAR; INTRAVENOUS
Status: DISPENSED
Start: 2017-05-04

## (undated) RX ORDER — KETOROLAC TROMETHAMINE 30 MG/ML
INJECTION, SOLUTION INTRAMUSCULAR; INTRAVENOUS
Status: DISPENSED
Start: 2017-05-04

## (undated) RX ORDER — NEOSTIGMINE METHYLSULFATE 5 MG/5 ML
SYRINGE (ML) INTRAVENOUS
Status: DISPENSED
Start: 2017-05-04

## (undated) RX ORDER — MEPERIDINE HYDROCHLORIDE 50 MG/ML
INJECTION INTRAMUSCULAR; INTRAVENOUS; SUBCUTANEOUS
Status: DISPENSED
Start: 2017-05-04

## (undated) RX ORDER — HYDROCODONE BITARTRATE AND ACETAMINOPHEN 5; 325 MG/1; MG/1
TABLET ORAL
Status: DISPENSED
Start: 2017-05-04